# Patient Record
Sex: FEMALE | Race: WHITE | Employment: OTHER | URBAN - METROPOLITAN AREA
[De-identification: names, ages, dates, MRNs, and addresses within clinical notes are randomized per-mention and may not be internally consistent; named-entity substitution may affect disease eponyms.]

---

## 2017-01-26 ENCOUNTER — OFFICE VISIT (OUTPATIENT)
Dept: OBGYN CLINIC | Age: 22
End: 2017-01-26

## 2017-01-26 VITALS
WEIGHT: 233 LBS | HEIGHT: 63 IN | DIASTOLIC BLOOD PRESSURE: 74 MMHG | BODY MASS INDEX: 41.29 KG/M2 | SYSTOLIC BLOOD PRESSURE: 122 MMHG

## 2017-01-26 DIAGNOSIS — Z01.419 ENCOUNTER FOR GYNECOLOGICAL EXAMINATION WITHOUT ABNORMAL FINDING: ICD-10-CM

## 2017-01-26 DIAGNOSIS — Z01.419 WELL WOMAN EXAM WITH ROUTINE GYNECOLOGICAL EXAM: Primary | ICD-10-CM

## 2017-01-26 RX ORDER — NORETHINDRONE ACETATE AND ETHINYL ESTRADIOL 1.5-30(21)
1 KIT ORAL DAILY
Qty: 3 PACKAGE | Refills: 4 | Status: SHIPPED | OUTPATIENT
Start: 2017-01-26 | End: 2017-10-17

## 2017-01-26 NOTE — PATIENT INSTRUCTIONS

## 2017-01-26 NOTE — PROGRESS NOTES
Annual exam ages 21-44    Madonna Hope is a ,  19 y.o. female [facility] Patient's last menstrual period was 01/10/2017 (approximate). .    She presents for her annual checkup. She is having cramping alot lately while not on menses. She reports that the cramping started when she stopped ocps. With regard to the Gardasil vaccine, she has received all 3 injections. Menstrual status:    Her periods are spotting in flow. She is using essentially none pads or tampons per day, usually regular and last 26-30 days. She denies dysmenorrhea. She reports no premenstrual symptoms. Contraception:    The current method of family planning is none. Sexual history:     She  reports that she currently engages in sexual activity and has had male partners. She reports using the following method of birth control/protection: Pill. .    Medical conditions:    Since her last annual GYN exam about one year ago, she has not the following changes in her health history: none. Pap and Mammogram History:    Her most recent Pap smear was abnormal, obtained 1 year(s) ago. The patient has never had a mammogram.    Breast Cancer History/Substance Abuse: negative    Past Medical History   Diagnosis Date    Abnormal Pap smear of cervix      14 ASCUS HPV POS  7/1/15 ASCUS HPV POS    Depression     Pap smear for cervical cancer screening 2015 ASCUS HPV POS  7/1/15 ASCUS HPV POS     Past Surgical History   Procedure Laterality Date    Hx other surgical       colonoscopy    Hx dilation and curettage         Current Outpatient Prescriptions   Medication Sig Dispense Refill    norethindrone-ethinyl estradiol-iron (JUNEL FE ,) 1.5 mg-30 mcg (21)/75 mg (7) tablet Take 1 Tab by mouth daily. 1 Package 11    OTHER Birth Control medication. Unable to remember name       Allergies: Review of patient's allergies indicates no known allergies.      Tobacco History:  reports that she has never smoked. She has never used smokeless tobacco.  Alcohol Abuse:  reports that she drinks alcohol. Drug Abuse:  reports that she does not use illicit drugs.     Family Medical/Cancer History:   Family History   Problem Relation Age of Onset    No Known Problems Mother         Review of Systems - History obtained from the patient  Constitutional: negative for weight loss, fever, night sweats  HEENT: negative for hearing loss, earache, congestion, snoring, sorethroat  CV: negative for chest pain, palpitations, edema  Resp: negative for cough, shortness of breath, wheezing  GI: negative for change in bowel habits, abdominal pain, black or bloody stools  : negative for frequency, dysuria, hematuria, vaginal discharge  MSK: negative for back pain, joint pain, muscle pain  Breast: negative for breast lumps, nipple discharge, galactorrhea  Skin :negative for itching, rash, hives  Neuro: negative for dizziness, headache, confusion, weakness  Psych: negative for anxiety, depression, change in mood  Heme/lymph: negative for bleeding, bruising, pallor    Physical Exam    Visit Vitals    /74 (BP 1 Location: Right arm, BP Patient Position: Sitting)    Ht 5' 3\" (1.6 m)    Wt 233 lb (105.7 kg)    LMP 01/10/2017 (Approximate)    BMI 41.27 kg/m2       Constitutional  · Appearance: well-nourished, well developed, alert, in no acute distress    HENT  · Head and Face: appears normal    Neck  · Inspection/Palpation: normal appearance, no masses or tenderness  · Lymph Nodes: no lymphadenopathy present  · Thyroid: gland size normal, nontender, no nodules or masses present on palpation    Chest  · Respiratory Effort: breathing unlabored    Breasts  · Inspection of Breasts: breasts symmetrical, no skin changes, no discharge present, nipple appearance normal, no skin retraction present  · Palpation of Breasts and Axillae: no masses present on palpation, no breast tenderness  · Axillary Lymph Nodes: no lymphadenopathy present    Gastrointestinal  · Abdominal Examination: abdomen non-tender to palpation, normal bowel sounds, no masses present  · Liver and spleen: no hepatomegaly present, spleen not palpable  · Hernias: no hernias identified    Genitourinary  · External Genitalia: normal appearance for age, no discharge present, no tenderness present, no inflammatory lesions present, no masses present, no atrophy present  · Vagina: normal vaginal vault without central or paravaginal defects, no discharge present, no inflammatory lesions present, no masses present  · Bladder: non-tender to palpation  · Urethra: appears normal  · Cervix: normal   · Uterus: normal size, shape and consistency  · Adnexa: no adnexal tenderness present, no adnexal masses present  · Perineum: perineum within normal limits, no evidence of trauma, no rashes or skin lesions present  · Anus: anus within normal limits, no hemorrhoids present  · Inguinal Lymph Nodes: no lymphadenopathy present    Skin  · General Inspection: no rash, no lesions identified    Neurologic/Psychiatric  · Mental Status:  · Orientation: grossly oriented to person, place and time  · Mood and Affect: mood normal, affect appropriate    . Assessment:  Routine gynecologic examination  Her current medical status is satisfactory with no evidence of significant gynecologic issues. Cramping- will restart ocps, if continues then will rto for ultrasound.     Plan:  Counseled re: diet, exercise, healthy lifestyle  Return for yearly wellness visits

## 2017-01-27 LAB
HBV SURFACE AG SERPL QL IA: NEGATIVE
HCV AB S/CO SERPL IA: <0.1 S/CO RATIO (ref 0–0.9)
HIV 1+2 AB+HIV1 P24 AG SERPL QL IA: NON REACTIVE
RPR SER QL: NON REACTIVE

## 2017-01-31 LAB
C TRACH RRNA CVX QL NAA+PROBE: NEGATIVE
CYTOLOGIST CVX/VAG CYTO: NORMAL
CYTOLOGY CVX/VAG DOC THIN PREP: NORMAL
CYTOLOGY HISTORY:: NORMAL
DX ICD CODE: NORMAL
LABCORP, 190119: NORMAL
Lab: NORMAL
Lab: NORMAL
N GONORRHOEA RRNA CVX QL NAA+PROBE: NEGATIVE
OTHER STN SPEC: NORMAL
PATH REPORT.FINAL DX SPEC: NORMAL
STAT OF ADQ CVX/VAG CYTO-IMP: NORMAL
T VAGINALIS RRNA SPEC QL NAA+PROBE: NEGATIVE

## 2017-10-17 ENCOUNTER — OFFICE VISIT (OUTPATIENT)
Dept: OBGYN CLINIC | Age: 22
End: 2017-10-17

## 2017-10-17 VITALS
DIASTOLIC BLOOD PRESSURE: 78 MMHG | BODY MASS INDEX: 40.04 KG/M2 | HEIGHT: 63 IN | WEIGHT: 226 LBS | SYSTOLIC BLOOD PRESSURE: 124 MMHG

## 2017-10-17 DIAGNOSIS — Z34.01 ENCOUNTER FOR SUPERVISION OF NORMAL FIRST PREGNANCY IN FIRST TRIMESTER: ICD-10-CM

## 2017-10-17 DIAGNOSIS — Z32.01 PREGNANCY EXAMINATION OR TEST, POSITIVE RESULT: Primary | ICD-10-CM

## 2017-10-17 PROBLEM — Z34.00 SUPERVISION OF NORMAL FIRST PREGNANCY: Status: ACTIVE | Noted: 2017-10-17

## 2017-10-17 LAB
ANTIBODY SCREEN, EXTERNAL: NEGATIVE
CHLAMYDIA, EXTERNAL: NEGATIVE
CYSTIC FIBROSIS, EXTERNAL: NORMAL
HBSAG, EXTERNAL: NEGATIVE
HCT, EXTERNAL: 38.5
HGB, EXTERNAL: 12.5
HIV, EXTERNAL: NEGATIVE
N. GONORRHEA, EXTERNAL: NEGATIVE
PLATELET CNT,   EXTERNAL: 302
RUBELLA, EXTERNAL: NORMAL
T. PALLIDUM, EXTERNAL: NEGATIVE
TYPE, ABO & RH, EXTERNAL: NORMAL
URINALYSIS, EXTERNAL: NEGATIVE

## 2017-10-17 NOTE — PROGRESS NOTES
Current pregnancy history: Doris Blackman is a ,  23 y.o. female Formerly named [facility] Patient's last menstrual period was 2017 (exact date). .  She presents for the evaluation of amenorrhea and a positive pregnancy test.    LMP history:  The date of her LMP is certain. Her last menstrual period was normal and lasted for 4 to 5 days. A urine pregnancy test was positive a few weeks ago. She was not on the pill at conception. Based on her LMP, her EDC is 18 and her EGA is 7 weeks,6 days. Her menstrual cycles are regular and occur approximately every 28 days  and range from 3 to 5 days. The last menses lasted the usual number of days. Ultrasound data:  She had an  ultrasound done by the ultrasound tech today which revealed a viable brunner pregnancy with a gestational age of 9 weeks and 0 days giving an [facility] of 18. Pregnancy symptoms:    Since her LMP she has experienced  urinary frequency, breast tenderness, and nausea. She has not been vomiting over the last few weeks. Associated signs and symptoms which she denies: dysuria, discharge, vaginal bleeding. Relevant past pregnancy history:   She has the following pregnancy history: Her last pregnancy was a missed AB. Relevant past medical history:(relevant to this pregnancy): noncontributory. Substance history: negative for alcohol, tobacco and street drugs. Positive for nothing. The patient was instructed to not change the cat litter. She admits close contact with children on a regular basis. She has had chicken pox or the vaccine in the past.   Patient denies issues with domestic violence. Genetic Screening/Teratology Counseling: (Includes patient, baby's father, or anyone in either family with:)  3.  Patient's age >/= 28 at [facility]?-- no  .   2.   Thalassemia ([place], [place], [address], or  background): MCV<80?--no.     3.  Neural tube defect (meningomyelocele, spina bifida, anencephaly)?--no.   4.  Congenital heart defect?--no.  5.  Down syndrome?--no.   6.  Cooper-Sachs (Religious, [place])?--no.   7.  Canavan's Disease?--no.   8.  Familial Dysautonomia?--no.   9.  Sickle cell disease or trait ()? --no   The patient has not been tested for sickle trait  10. Hemophilia or other blood disorders?--no. 11.  Muscular dystrophy?--no. 12.  Cystic fibrosis?--no. 13.  Hendricks's Chorea?--no. 14.  Mental retardation/autism (if yes was person tested for Fragile X)?--no. 15.  Other inherited genetic or chromosomal disorder?--no. 12.  Maternal metabolic disorder (DM, PKU, etc)?--no. 17.  Patient or FOB with a child with a birth defect not listed above?--no.  17a. Patient or FOB with a birth defect themselves?--no. 18.  Recurrent pregnancy loss, or stillbirth?--no. 19.  Any medications since LMP other than prenatal vitamins (include vitamins, supplements, OTC meds, drugs, alcohol)?--no. 20.  Any other genetic/environmental exposure to discuss?--no. Infection History:  1. Lives with someone with TB or TB exposed?--no.   2.  Patient or partner has history of genital herpes?--yes, pt with h/o hsv  3. Rash or viral illness since LMP?--no.    4.  History of STD (GC, CT, HPV, syphilis, HIV)? --no   5. Other: OTHER? Past Medical History:   Diagnosis Date    Abnormal Pap smear of cervix     5/28/14 ASCUS HPV POS  7/1/15 ASCUS HPV POS    Depression     Pap smear for cervical cancer screening 01/26/2017 5/28/14 ASCUS HPV POS  7/1/15 ASCUS HPV POS, 1/26/17 neg     Past Surgical History:   Procedure Laterality Date    HX DILATION AND CURETTAGE      HX OTHER SURGICAL      colonoscopy     Social History     Occupational History    Not on file.      Social History Main Topics    Smoking status: Never Smoker    Smokeless tobacco: Never Used    Alcohol use Yes      Comment: occasionally    Drug use: No    Sexual activity: Yes     Partners: Male     Birth control/ protection: Pill     Family History   Problem Relation Age of Onset    No Known Problems Mother      OB History    Para Term  AB Living   2        SAB TAB Ectopic Molar Multiple Live Births              # Outcome Date GA Lbr Taqueria/2nd Weight Sex Delivery Anes PTL Lv   2 Current            1                Birth Comments: System Generated. Please review and update pregnancy details. No Known Allergies  Prior to Admission medications    Medication Sig Start Date End Date Taking? Authorizing Provider   OTHER Birth Control medication.  Unable to remember name    Phys Other, MD        Review of Systems: History obtained from the patient  Constitutional: negative for weight loss, fever, night sweats  HEENT: negative for hearing loss, earache, congestion, snoring, sore throat  CV: negative for chest pain, palpitations, edema  Resp: negative for cough, shortness of breath, wheezing  Breast: negative for breast lumps, nipple discharge, galactorrhea  GI: negative for change in bowel habits, abdominal pain, black or bloody stools  : negative for frequency, dysuria, hematuria, vaginal discharge  MSK: negative for back pain, joint pain, muscle pain  Skin: negative for itching, rash, hives  Neuro: negative for dizziness, headache, confusion, weakness  Psych: negative for anxiety, depression, change in mood  Heme/lymph: negative for bleeding, bruising, pallor    Objective:  Visit Vitals    /78    Ht 5' 3\" (1.6 m)    Wt 226 lb (102.5 kg)    LMP 2017 (Exact Date)    BMI 40.03 kg/m2       Physical Exam:     Constitutional  · Appearance: well-nourished, well developed, alert, in no acute distress    HENT  · Head  · Face: appears normal  · Eyes: appear normal  · Ears: normal  · Mouth: normal  · Lips: no lesions    Neck  · Inspection/Palpation: normal appearance, no masses or tenderness  · Lymph Nodes: no lymphadenopathy present  · Thyroid: gland size normal, nontender, no nodules or masses present on palpation    Chest  · Respiratory Effort: breathing unlabored    Skin  · General Inspection: no rash, no lesions identified    Neurologic/Psychiatric  · Mental Status:  · Orientation: grossly oriented to person, place and time  · Mood and Affect: mood normal, affect appropriate    Assessment:   Intrauterine pregnancy with the following problems identified: h/o hsv, will start valtex at 36 weeks. Plan:     Offered CF testing, CVS, Nuchal Translucency, MSAFP, amnio, and discussed NIPT  Course of pregnancy discussed including visit schedule, routine U/S, glucola testing, etc.  Avoid alcoholic beverages and illicit/recreational drugs use  Take prenatal vitamins or folic acid daily. Hospital and practice style discussed with coverage system. Discussed nutrition, toxoplasmosis precautions, sexual activity, exercise, need for influenza vaccine, environmental and work hazards, travel advice, screen for domestic violence, need for seat belts. Discussed seafood, unpasteurized dairy products, deli meat, artificial sweeteners, and caffeine. Information on prenatal classes/breastfeeding given. Information on circumcision given  Patient encouraged not to smoke. Discussed current prescription drug use. Given medication list.  Discussed the use of over the counter medications and chemicals. Route of delivery discussed, including risks, benefits, and alternatives of  versus repeat LTCS. Pt understands risk of hemorrhage during pregnancy and post delivery and would accept blood products if necessary in life-threatening emergencies    Handouts given to pt.

## 2017-10-17 NOTE — MR AVS SNAPSHOT
Visit Information Date & Time Provider Department Dept. Phone Encounter #  
 10/17/2017  1:30 PM Jovita Arguelles MD [facility] [phone] [id number] Upcoming Health Maintenance Date Due INFLUENZA AGE 9 TO ADULT 8/1/2017 PAP AKA CERVICAL CYTOLOGY 1/26/2020 Allergies as of 10/17/2017  Review Complete On: 10/17/2017 By: Jovita Arguelles MD  
 No Known Allergies Current Immunizations  Never Reviewed Name Date HPV (9-valent) 2/19/2016, 10/19/2015, 8/18/2015 10:07 AM  
  
 Not reviewed this visit You Were Diagnosed With   
  
 Codes Comments Pregnancy examination or test, positive result    -  Primary ICD-10-CM: Z32.01 
ICD-9-CM: V72.42 Encounter for supervision of normal first pregnancy in first trimester     ICD-10-CM: Z34.01 
ICD-9-CM: V22.0 Vitals BP Height(growth percentile) Weight(growth percentile) LMP BMI OB Status 124/78 5' 3\" (1.6 m) 226 lb (102.5 kg) 08/23/2017 (Exact Date) 40.03 kg/m2 Pregnant Smoking Status Never Smoker BMI and BSA Data Body Mass Index Body Surface Area 40.03 kg/m 2 2.13 m 2 Preferred Pharmacy Pharmacy Name Phone [facility] [address], [address] [place] [address] [address] [phone] Your Updated Medication List  
  
   
This list is accurate as of: 10/17/17  1:47 PM.  Always use your most recent med list.  
  
  
  
  
 OTHER Birth Control medication. Unable to remember name We Performed the Following ANTIBODY SCREEN A0035874 CPT(R)] CBC W/O DIFF [87895 CPT(R)] CT/NG/T.VAGINALIS AMPLIFICATION [id number] CPT(R)] CULTURE, URINE V7942696 CPT(R)] CYSTIC FIBROSIS MUTATION 97 [[id number] Custom] HEMOGLOBIN FRACTIONATION [[id number] Custom] HEP B SURFACE AG X2365066 CPT(R)] HIV 1/2 AG/AB, 4TH GENERATION,W RFLX CONFIRM [[id number] Custom] PARVOVIRUS B19 AB, IGG [70823 CPT(R)] PARVOVIRUS B19 AB, IGM [95942 CPT(R)] PLATELET COUNT [47359 CPT(R)] RUBELLA AB, IGG [id number] CPT(R)] T PALLIDUM SCREEN W/REFLEX [[id number] Custom] TYPE, ABO & RH [42223 CPT(R)] Introducing [facility]! Dear Nelson Smart: 
Thank you for requesting a [employer] account. Our records indicate that you already have an active [employer] account. You can access your account anytime at [email or web address] Did you know that you can access your hospital and ER discharge instructions at any time in [employer]? You can also review all of your test results from your hospital stay or ER visit. Additional Information If you have questions, please visit the Frequently Asked Questions section of the [employer] website at [email or web address] Remember, [employer] is NOT to be used for urgent needs. For medical emergencies, dial 911. Now available from your iPhone and Android! Please provide this summary of care documentation to your next provider. Your primary care clinician is listed as Kate Rowe. If you have any questions after today's visit, please call [phone].

## 2017-10-18 LAB — BACTERIA UR CULT: NO GROWTH

## 2017-10-19 LAB
C TRACH RRNA SPEC QL NAA+PROBE: NEGATIVE
N GONORRHOEA RRNA SPEC QL NAA+PROBE: NEGATIVE
T VAGINALIS RRNA SPEC QL NAA+PROBE: NEGATIVE

## 2017-10-24 LAB
ABO GROUP BLD: NORMAL
B19V IGG SER IA-ACNC: 5.7 INDEX (ref 0–0.8)
B19V IGM SER IA-ACNC: 0.1 INDEX (ref 0–0.8)
BLD GP AB SCN SERPL QL: NEGATIVE
CFTR MUT ANL BLD/T: NORMAL
ERYTHROCYTE [DISTWIDTH] IN BLOOD BY AUTOMATED COUNT: 13.9 % (ref 12.3–15.4)
GENE DIS ANL CARRIER INTERP-IMP: NORMAL
HBV SURFACE AG SERPL QL IA: NEGATIVE
HCT VFR BLD AUTO: 38.5 % (ref 34–46.6)
HGB A MFR BLD: 97.6 % (ref 94–98)
HGB A2 MFR BLD COLUMN CHROM: 2.4 % (ref 0.7–3.1)
HGB BLD-MCNC: 12.5 G/DL (ref 11.1–15.9)
HGB C MFR BLD: 0 %
HGB F MFR BLD: 0 % (ref 0–2)
HGB FRACT BLD-IMP: NORMAL
HGB S BLD QL SOLY: NEGATIVE
HGB S MFR BLD: 0 %
HIV 1+2 AB+HIV1 P24 AG SERPL QL IA: NON REACTIVE
MCH RBC QN AUTO: 29.6 PG (ref 26.6–33)
MCHC RBC AUTO-ENTMCNC: 32.5 G/DL (ref 31.5–35.7)
MCV RBC AUTO: 91 FL (ref 79–97)
PLATELET # BLD AUTO: 302 X10E3/UL (ref 150–379)
RBC # BLD AUTO: 4.23 X10E6/UL (ref 3.77–5.28)
RH BLD: POSITIVE
RUBV IGG SERPL IA-ACNC: 2.21 INDEX
T PALLIDUM AB SER QL IA: NEGATIVE
WBC # BLD AUTO: 8.6 X10E3/UL (ref 3.4–10.8)

## 2017-11-14 ENCOUNTER — ROUTINE PRENATAL (OUTPATIENT)
Dept: OBGYN CLINIC | Age: 22
End: 2017-11-14

## 2017-11-14 VITALS — WEIGHT: 219 LBS | DIASTOLIC BLOOD PRESSURE: 84 MMHG | SYSTOLIC BLOOD PRESSURE: 118 MMHG | BODY MASS INDEX: 38.79 KG/M2

## 2017-11-14 DIAGNOSIS — Z34.01 ENCOUNTER FOR SUPERVISION OF NORMAL FIRST PREGNANCY IN FIRST TRIMESTER: ICD-10-CM

## 2017-11-14 DIAGNOSIS — Z34.81 PRENATAL CARE, SUBSEQUENT PREGNANCY IN FIRST TRIMESTER: Primary | ICD-10-CM

## 2017-11-14 NOTE — MR AVS SNAPSHOT
Visit Information Date & Time Provider Department Dept. Phone Encounter #  
 11/14/2017  7:40 AM Yao Bertrand MD [facility] [phone] [id number] Upcoming Health Maintenance Date Due Influenza Age 5 to Adult 8/1/2017 PAP AKA CERVICAL CYTOLOGY 1/26/2020 Allergies as of 11/14/2017  Review Complete On: 11/14/2017 By: David Brunson No Known Allergies Current Immunizations  Never Reviewed Name Date HPV (9-valent) 2/19/2016, 10/19/2015, 8/18/2015 10:07 AM  
  
 Not reviewed this visit You Were Diagnosed With   
  
 Codes Comments Prenatal care, subsequent pregnancy in first trimester    -  Primary ICD-10-CM: Z34.81 ICD-9-CM: V22.1 Vitals BP Weight(growth percentile) LMP BMI OB Status Smoking Status 118/84 219 lb (99.3 kg) 08/23/2017 (Exact Date) 38.79 kg/m2 Pregnant Never Smoker BMI and BSA Data Body Mass Index Body Surface Area 38.79 kg/m 2 2.1 m 2 Preferred Pharmacy Pharmacy Name Phone [facility] [address] [place] [address] [address] [facility] [address] [phone] Your Updated Medication List  
  
   
This list is accurate as of: 11/14/17  8:02 AM.  Always use your most recent med list.  
  
  
  
  
 doxylamine-pyridoxine (vit B6) 10-10 mg Tbec DR tablet Commonly known as:  Sarah Degroot Take 2 Tabs by mouth nightly. ondansetron hcl 8 mg tablet Commonly known as:  Humera Alarcon Take 1 Tab by mouth every eight (8) hours as needed for Nausea. OTHER Birth Control medication. Unable to remember name We Performed the Following MATERNAL SCREEN, 1ST TRIMESTER [[id number] Custom] Comments:  
 219 pounds 1fetus 11weeks 6days NOT insulin dep PRAVIN 5/30/18  To-Do List   
 11/20/2017 10:15 AM  
  Appointment with ULTRASOUND 3 [facility] at [facility] ([phone]) [facility]! Dear Tiffanie Blevins: Thank you for requesting a [employer] account. Our records indicate that you already have an active [employer] account. You can access your account anytime at [email or web address] Did you know that you can access your hospital and ER discharge instructions at any time in [employer]? You can also review all of your test results from your hospital stay or ER visit. Additional Information If you have questions, please visit the Frequently Asked Questions section of the [employer] website at [email or web address] Remember, [employer] is NOT to be used for urgent needs. For medical emergencies, dial 911. Now available from your iPhone and Android! Please provide this summary of care documentation to your next provider. Your primary care clinician is listed as Kate Rowe. If you have any questions after today's visit, please call [phone].

## 2017-11-20 ENCOUNTER — HOSPITAL ENCOUNTER (OUTPATIENT)
Dept: PERINATAL CARE | Age: 22
Discharge: HOME | End: 2017-11-20
Attending: OBSTETRICS & GYNECOLOGY
Payer: COMMERCIAL

## 2017-11-20 LAB
# FETUSES US: 1
1ST TRIMESTER SCN+NT PATIENT-IMP: NORMAL
AGE AT DELIVERY: 24.9 YEARS
COMMENTS, 017532: NORMAL
FET CRL US.MEAS: 50.8 MM
FET NUCHAL FOLD MOM THICKNESS US.MEAS: 1.31
FET NUCHAL FOLD THICKNESS US.MEAS: 1.8 MM
FET TS 18 RISK FROM MAT AGE: NORMAL
FET TS 21 RISK FROM MAT AGE: NORMAL
GA: 11 WEEKS
HCG ADJ MOM SERPL: 1.03
HCG SERPL-ACNC: 98.6 IU/ML
INHIBIN A ADJ MOM SERPL: 0.97
INHIBIN A SERPL-MCNC: 251.9 PG/ML
NOTE, 018271: NORMAL
PAPP-A MOM, 017516: 0.54
PAPP-A SERPL-MCNC: 173.7 NG/ML
RESULTS, 017501: NORMAL
SONOGRAPHER: NORMAL
TS 18 RISK FETUS: NORMAL
TS 21 RISK FETUS: NORMAL
US DATE: NORMAL

## 2017-11-20 PROCEDURE — 76813 OB US NUCHAL MEAS 1 GEST: CPT | Performed by: OBSTETRICS & GYNECOLOGY

## 2017-12-12 ENCOUNTER — ROUTINE PRENATAL (OUTPATIENT)
Dept: OBGYN CLINIC | Age: 22
End: 2017-12-12

## 2017-12-12 VITALS — BODY MASS INDEX: 38.09 KG/M2 | WEIGHT: 215 LBS | DIASTOLIC BLOOD PRESSURE: 78 MMHG | SYSTOLIC BLOOD PRESSURE: 116 MMHG

## 2017-12-12 DIAGNOSIS — Z34.02 ENCOUNTER FOR SUPERVISION OF NORMAL FIRST PREGNANCY IN SECOND TRIMESTER: ICD-10-CM

## 2017-12-12 DIAGNOSIS — Z34.82 PRENATAL CARE, SUBSEQUENT PREGNANCY IN SECOND TRIMESTER: Primary | ICD-10-CM

## 2017-12-12 LAB — AFP, MATERNAL, EXTERNAL: NORMAL

## 2017-12-12 NOTE — MR AVS SNAPSHOT
Visit Information Date & Time Provider Department Dept. Phone Encounter #  
 12/12/2017  8:30 AM Hernan Calvin MD [facility] [phone] [id number] Upcoming Health Maintenance Date Due Influenza Age 5 to Adult 8/1/2017 PAP AKA CERVICAL CYTOLOGY 1/26/2020 Allergies as of 12/12/2017  Review Complete On: 12/12/2017 By: Miroslava Emerson RN No Known Allergies Current Immunizations  Never Reviewed Name Date HPV (9-valent) 2/19/2016, 10/19/2015, 8/18/2015 10:07 AM  
  
 Not reviewed this visit You Were Diagnosed With   
  
 Codes Comments Prenatal care, subsequent pregnancy in second trimester    -  Primary ICD-10-CM: Z34.82 
ICD-9-CM: V22.1 Vitals BP Weight(growth percentile) LMP BMI OB Status Smoking Status 116/78 215 lb (97.5 kg) 08/23/2017 (Exact Date) 38.09 kg/m2 Pregnant Never Smoker BMI and BSA Data Body Mass Index Body Surface Area 38.09 kg/m 2 2.08 m 2 Preferred Pharmacy Pharmacy Name Phone [facility] [address], [address] [facility] [address] [phone] Your Updated Medication List  
  
   
This list is accurate as of: 12/12/17  8:45 AM.  Always use your most recent med list.  
  
  
  
  
 doxylamine-pyridoxine (vit B6) 10-10 mg Tbec DR tablet Commonly known as:  [facility] Take 2 Tabs by mouth nightly. ondansetron hcl 8 mg tablet Commonly known as:  [facility] Take 1 Tab by mouth every eight (8) hours as needed for Nausea. OTHER Birth Control medication. Unable to remember name We Performed the Following   
 AFP, MATERNAL SCREEN [13895 CPT(R)] Patient Instructions Weeks 14 to 18 of Your Pregnancy: Care Instructions Your Care Instructions During this time, you may start to \"show,\" so that you look pregnant to people around you.  You may also notice some changes in your skin, such as itchy spots on your palms or acne on your face. Your baby is now able to pass urine, and your baby's first stool (meconium) is starting to collect in his or her intestines. Hair is also beginning to grow on your baby's head. At your next visit, between weeks 18 and 20, your doctor may do an ultrasound test. The test allows your doctor to check for certain problems. Your doctor can also tell the sex of your baby. This is a good time to think about whether you want to know whether your baby is a boy or a girl. Talk to your doctor about getting a flu shot to help keep you healthy during your pregnancy. As your pregnancy moves along, it is common to worry or feel anxious. Your body is changing a lot. And you are thinking about giving birth, the health of your baby, and becoming a parent. You can learn to cope with any anxiety and stress you feel. Follow-up care is a key part of your treatment and safety. Be sure to make and go to all appointments, and call your doctor if you are having problems. It's also a good idea to know your test results and keep a list of the medicines you take. How can you care for yourself at home? ?Reduce stress ? · Ask for help with cooking and housekeeping. ? · Figure out who or what causes your stress. Avoid these people or situations as much as possible. ? · Relax every day. Taking 10- to 15-minute breaks can make a big difference. Take a walk, listen to music, or take a warm bath. ? · Learn relaxation techniques at prenatal or yoga class. Or buy a relaxation tape. ? · List your fears about having a baby and becoming a parent. Share the list with someone you trust. Decide which worries are really small, and try to let them go. Exercise ? · If you did not exercise much before pregnancy, start slowly. Walking is best. Velez Evangelina yourself, and do a little more every day.   
? · Brisk walking, easy jogging, low-impact aerobics, water aerobics, and yoga are good choices. Some sports, such as scuba diving, horseback riding, downhill skiing, gymnastics, and water skiing, are not a good idea. ? · Try to do at least 2½ hours a week of moderate exercise, such as a fast walk. One way to do this is to be active 30 minutes a day, at least 5 days a week. It's fine to be active in blocks of 10 minutes or more throughout your day and week. ? · Wear loose clothing. And wear shoes and a bra that provide good support. ? · Warm up and cool down to start and finish your exercise. ? · If you want to use weights, be sure to use light weights. They reduce stress on your joints. ?Stay at the best weight for you ? · Experts recommend that you gain about 1 pound a month during the first 3 months of your pregnancy. ? · Experts recommend that you gain about 1 pound a week during your last 6 months of pregnancy, for a total weight gain of 25 to 35 pounds. ? · If you are underweight, you will need to gain more weight (about 28 to 40 pounds). ? · If you are overweight, you may not need to gain as much weight (about 15 to 25 pounds). ? · If you are gaining weight too fast, use common sense. Exercise every day, and limit sweets, fast foods, and fats. Choose lean meats, fruits, and vegetables. ? · If you are having twins or more, your doctor may refer you to a dietitian. Where can you learn more? Go to http://nghia-keshawn.info/. Enter S495 in the search box to learn more about \"Weeks 14 to 18 of Your Pregnancy: Care Instructions. \" Current as of: March 16, 2017 Content Version: 11.4 © 1843-6999 Teachable. Care instructions adapted under license by [employer] (which disclaims liability or warranty for this information).  If you have questions about a medical condition or this instruction, always ask your healthcare professional. [address] any warranty or liability for your use of this information. Introducing \[facility]\"" & HEALTH SERVICES! Dear [facility]: 
Thank you for requesting a Kingmaker account. Our records indicate that you already have an active Kingmaker account. You can access your account anytime at https://Aria Networks. Acucar Guarani/Aria Networks Did you know that you can access your hospital and ER discharge instructions at any time in Kingmaker? You can also review all of your test results from your hospital stay or ER visit. Additional Information If you have questions, please visit the Frequently Asked Questions section of the Kingmaker website at [email or web address] Remember, Kingmaker is NOT to be used for urgent needs. For medical emergencies, dial 911. Now available from your iPhone and Android! Please provide this summary of care documentation to your next provider. Your primary care clinician is listed as Kate Rowe. If you have any questions after today's visit, please call [phone].

## 2017-12-12 NOTE — PATIENT INSTRUCTIONS
Weeks 14 to 18 of Your Pregnancy: Care Instructions  Your Care Instructions    During this time, you may start to \"show,\" so that you look pregnant to people around you. You may also notice some changes in your skin, such as itchy spots on your palms or acne on your face. Your baby is now able to pass urine, and your baby's first stool (meconium) is starting to collect in his or her intestines. Hair is also beginning to grow on your baby's head. At your next visit, between weeks 18 and 20, your doctor may do an ultrasound test. The test allows your doctor to check for certain problems. Your doctor can also tell the sex of your baby. This is a good time to think about whether you want to know whether your baby is a boy or a girl. Talk to your doctor about getting a flu shot to help keep you healthy during your pregnancy. As your pregnancy moves along, it is common to worry or feel anxious. Your body is changing a lot. And you are thinking about giving birth, the health of your baby, and becoming a parent. You can learn to cope with any anxiety and stress you feel. Follow-up care is a key part of your treatment and safety. Be sure to make and go to all appointments, and call your doctor if you are having problems. It's also a good idea to know your test results and keep a list of the medicines you take. How can you care for yourself at home? ?Reduce stress  ? · Ask for help with cooking and housekeeping. ? · Figure out who or what causes your stress. Avoid these people or situations as much as possible. ? · Relax every day. Taking 10- to 15-minute breaks can make a big difference. Take a walk, listen to music, or take a warm bath. ? · Learn relaxation techniques at prenatal or yoga class. Or buy a relaxation tape. ? · List your fears about having a baby and becoming a parent. Share the list with someone you trust. Decide which worries are really small, and try to let them go. Exercise  ?  · If you did not exercise much before pregnancy, start slowly. Walking is best. Donnamarie Silvius yourself, and do a little more every day. ? · Brisk walking, easy jogging, low-impact aerobics, water aerobics, and yoga are good choices. Some sports, such as scuba diving, horseback riding, downhill skiing, gymnastics, and water skiing, are not a good idea. ? · Try to do at least 2½ hours a week of moderate exercise, such as a fast walk. One way to do this is to be active 30 minutes a day, at least 5 days a week. It's fine to be active in blocks of 10 minutes or more throughout your day and week. ? · Wear loose clothing. And wear shoes and a bra that provide good support. ? · Warm up and cool down to start and finish your exercise. ? · If you want to use weights, be sure to use light weights. They reduce stress on your joints. ?Stay at the best weight for you  ? · Experts recommend that you gain about 1 pound a month during the first 3 months of your pregnancy. ? · Experts recommend that you gain about 1 pound a week during your last 6 months of pregnancy, for a total weight gain of 25 to 35 pounds. ? · If you are underweight, you will need to gain more weight (about 28 to 40 pounds). ? · If you are overweight, you may not need to gain as much weight (about 15 to 25 pounds). ? · If you are gaining weight too fast, use common sense. Exercise every day, and limit sweets, fast foods, and fats. Choose lean meats, fruits, and vegetables. ? · If you are having twins or more, your doctor may refer you to a dietitian. Where can you learn more? Go to http://nghia-keshawn.info/. Enter G992 in the search box to learn more about \"Weeks 14 to 18 of Your Pregnancy: Care Instructions. \"  Current as of: March 16, 2017  Content Version: 11.4  © 3237-6093 Food52. Care instructions adapted under license by [employer] (which disclaims liability or warranty for this information).  If you have questions about a medical condition or this instruction, always ask your healthcare professional. [place] [address] any warranty or liability for your use of this information.

## 2017-12-12 NOTE — PROGRESS NOTES
Pt doing well, see prenatal flowsheet.   msafp today  20 week ultrasound at [facility]

## 2017-12-14 LAB
AFP ADJ MOM SERPL: 0.64
AFP INTERP SERPL-IMP: NORMAL
AFP INTERP SERPL-IMP: NORMAL
AFP SERPL-MCNC: 14.2 NG/ML
AGE AT DELIVERY: 24.9 YEARS
COMMENT, 01827: NORMAL
GA METHOD: NORMAL
GA: 15 WEEKS
IDDM PATIENT QL: NO
MULTIPLE PREGNANCY: NO
NEURAL TUBE DEFECT RISK FETUS: NORMAL %
RESULTS, 017004: NORMAL

## 2018-01-09 ENCOUNTER — ROUTINE PRENATAL (OUTPATIENT)
Dept: OBGYN CLINIC | Age: 23
End: 2018-01-09

## 2018-01-09 VITALS — DIASTOLIC BLOOD PRESSURE: 76 MMHG | SYSTOLIC BLOOD PRESSURE: 124 MMHG | BODY MASS INDEX: 37.2 KG/M2 | WEIGHT: 210 LBS

## 2018-01-09 DIAGNOSIS — Z34.02 ENCOUNTER FOR SUPERVISION OF NORMAL FIRST PREGNANCY IN SECOND TRIMESTER: Primary | ICD-10-CM

## 2018-01-09 NOTE — PROGRESS NOTES
Pt doing well, see prenatal flowsheet.   20 week scan with [facility] next week

## 2018-01-09 NOTE — MR AVS SNAPSHOT
Visit Information Date & Time Provider Department Dept. Phone Encounter #  
 1/9/2018  7:50 AM Silviano Mcgowan MD [facility] [phone] [id number] Upcoming Health Maintenance Date Due Influenza Age 5 to Adult 8/1/2017 PAP AKA CERVICAL CYTOLOGY 1/26/2020 Allergies as of 1/9/2018  Review Complete On: 1/9/2018 By: Andres America No Known Allergies Current Immunizations  Never Reviewed Name Date HPV (9-valent) 2/19/2016, 10/19/2015, 8/18/2015 10:07 AM  
  
 Not reviewed this visit You Were Diagnosed With   
  
 Codes Comments Encounter for supervision of normal first pregnancy in second trimester    -  Primary ICD-10-CM: Z34.02 
ICD-9-CM: V22.0 Vitals BP Weight(growth percentile) LMP BMI OB Status Smoking Status 124/76 210 lb (95.3 kg) 08/23/2017 (Exact Date) 37.2 kg/m2 Pregnant Never Smoker BMI and BSA Data Body Mass Index Body Surface Area  
 37.2 kg/m 2 2.06 m 2 Preferred Pharmacy Pharmacy Name Phone [address], [facility] [address] [facility] [address] [phone] Your Updated Medication List  
  
   
This list is accurate as of: 1/9/18  8:17 AM.  Always use your most recent med list.  
  
  
  
  
 doxylamine-pyridoxine (vit B6) 10-10 mg Tbec DR tablet Commonly known as:  Bernis Cyphers Take 2 Tabs by mouth nightly. ondansetron hcl 8 mg tablet Commonly known as:  Patsy Fracisco Take 1 Tab by mouth every eight (8) hours as needed for Nausea. OTHER Birth Control medication. Unable to remember name Introducing [facility] & HEALTH SERVICES! Dear Jake Fabian: 
Thank you for requesting a TheCreator.ME account. Our records indicate that you already have an active TheCreator.ME account. You can access your account anytime at [email or web address] (formerly Tabula Digita). Aspiring Minds/DimensionU (formerly Tabula Digita) Did you know that you can access your hospital and ER discharge instructions at any time in ADTZ? You can also review all of your test results from your hospital stay or ER visit. Additional Information If you have questions, please visit the Frequently Asked Questions section of the ADTZ website at [email or web address] [email or web address] Angelst/. Remember, ADTZ is NOT to be used for urgent needs. For medical emergencies, dial 911. Now available from your iPhone and Android! Please provide this summary of care documentation to your next provider. Your primary care clinician is listed as Kate Rowe. If you have any questions after today's visit, please call [phone].

## 2018-01-16 ENCOUNTER — HOSPITAL ENCOUNTER (OUTPATIENT)
Dept: PERINATAL CARE | Age: 23
Discharge: HOME | End: 2018-01-16
Attending: OBSTETRICS & GYNECOLOGY
Payer: COMMERCIAL

## 2018-01-16 PROCEDURE — 76811 OB US DETAILED SNGL FETUS: CPT | Performed by: OBSTETRICS & GYNECOLOGY

## 2018-02-06 ENCOUNTER — ROUTINE PRENATAL (OUTPATIENT)
Dept: OBGYN CLINIC | Age: 23
End: 2018-02-06

## 2018-02-06 VITALS — SYSTOLIC BLOOD PRESSURE: 110 MMHG | WEIGHT: 215 LBS | BODY MASS INDEX: 38.09 KG/M2 | DIASTOLIC BLOOD PRESSURE: 72 MMHG

## 2018-02-06 DIAGNOSIS — Z34.02 ENCOUNTER FOR SUPERVISION OF NORMAL FIRST PREGNANCY IN SECOND TRIMESTER: Primary | ICD-10-CM

## 2018-02-06 NOTE — MR AVS SNAPSHOT
[address] [facility] [address] [facility] 
[phone] Patient: Eddie Faustin MRN: [id number] :1995 Visit Information Date & Time Provider Department Dept. Phone Encounter #  
 2018  3:00 PM MD Tristan Rosas [phone] [id number]  
  
 2018  3:00 PM  
OB VISIT with MD Tristan Rosas (Zeinab [place]) Appt Note: 4wk fob FW  
 [address] [facility] [address] [facility] [address] [phone]  
[address] Upcoming Health Maintenance Date Due Influenza Age 5 to Adult 2017 PAP AKA CERVICAL CYTOLOGY 2020 Allergies as of 2018  Review Complete On: 2018 By: Boogie Dennis MD  
 No Known Allergies Current Immunizations  Never Reviewed Name Date HPV (9-valent) 2016, 10/19/2015, 2015 10:07 AM  
  
 Not reviewed this visit You Were Diagnosed With   
  
 Codes Comments Encounter for supervision of normal first pregnancy in third trimester    -  Primary ICD-10-CM: Z34.03 
ICD-9-CM: V22.0 Vitals BP Weight(growth percentile) LMP BMI OB Status Smoking Status 110/72 215 lb (97.5 kg) 2017 (Exact Date) 38.09 kg/m2 Pregnant Never Smoker BMI and BSA Data Body Mass Index Body Surface Area 38.09 kg/m 2 2.08 m 2 Preferred Pharmacy Pharmacy Name Phone [address], [address] [place] [address] [address] [phone] Your Updated Medication List  
  
   
This list is accurate as of: 18  3:00 PM.  Always use your most recent med list.  
  
  
  
  
 doxylamine-pyridoxine (vit B6) 10-10 mg Tbec DR tablet Commonly known as:  Jona Aguilar Take 2 Tabs by mouth nightly. ondansetron hcl 8 mg tablet Commonly known as:  Xavier Cotter  
 Take 1 Tab by mouth every eight (8) hours as needed for Nausea. OTHER Birth Control medication. Unable to remember name Introducing [facility] & HEALTH SERVICES! Dear Kirs Julio: 
Thank you for requesting a Humouno account. Our records indicate that you already have an active Humouno account. You can access your account anytime at [email or web address] Harper-Swakum Corporation/Airu Did you know that you can access your hospital and ER discharge instructions at any time in Humouno? You can also review all of your test results from your hospital stay or ER visit. Additional Information If you have questions, please visit the Frequently Asked Questions section of the Humouno website at [email or web address]/[email or web address]/. Remember, Humouno is NOT to be used for urgent needs. For medical emergencies, dial 911. Now available from your iPhone and Android! Please provide this summary of care documentation to your next provider. Your primary care clinician is listed as Kate Rowe. If you have any questions after today's visit, please call [phone].

## 2018-03-13 ENCOUNTER — ROUTINE PRENATAL (OUTPATIENT)
Dept: OBGYN CLINIC | Age: 23
End: 2018-03-13

## 2018-03-13 VITALS — WEIGHT: 219 LBS | SYSTOLIC BLOOD PRESSURE: 120 MMHG | DIASTOLIC BLOOD PRESSURE: 84 MMHG | BODY MASS INDEX: 38.79 KG/M2

## 2018-03-13 DIAGNOSIS — Z23 ENCOUNTER FOR IMMUNIZATION: Primary | ICD-10-CM

## 2018-03-13 DIAGNOSIS — Z34.02 ENCOUNTER FOR SUPERVISION OF NORMAL FIRST PREGNANCY IN SECOND TRIMESTER: ICD-10-CM

## 2018-03-13 LAB
GTT, 1 HR, GLUCOLA, EXTERNAL: NORMAL
GTT, 1 HR, GLUCOLA, EXTERNAL: NORMAL

## 2018-03-13 NOTE — MR AVS SNAPSHOT
[address] [place] [address] 
[phone] Patient: Merari Rogers MRN: [id number] :1995 Visit Information Date & Time Provider Department Dept. Phone Encounter #  
 3/13/2018  8:50 AM Tanja Calle MD [facility] [phone] [id number] Upcoming Health Maintenance Date Due Influenza Age 5 to Adult 2017 OB 3RD TRIMESTER TDAP 3/6/2018 PAP AKA CERVICAL CYTOLOGY 2020 Allergies as of 3/13/2018  Review Complete On: 3/13/2018 By: Minnie Rousseau No Known Allergies Current Immunizations  Never Reviewed Name Date HPV (9-valent) 2016, 10/19/2015, 2015 10:07 AM  
  
 Not reviewed this visit You Were Diagnosed With   
  
 Codes Comments Encounter for supervision of normal first pregnancy in second trimester     ICD-10-CM: Z34.02 
ICD-9-CM: V22.0 Vitals BP Weight(growth percentile) LMP BMI OB Status Smoking Status 120/84 219 lb (99.3 kg) 2017 (Exact Date) 38.79 kg/m2 Pregnant Never Smoker BMI and BSA Data Body Mass Index Body Surface Area 38.79 kg/m 2 2.1 m 2 Preferred Pharmacy Pharmacy Name Phone [address], [address] [place] [address] [address] [facility] [address] [phone] Your Updated Medication List  
  
   
This list is accurate as of 3/13/18  9:11 AM.  Always use your most recent med list.  
  
  
  
  
 doxylamine-pyridoxine (vit B6) 10-10 mg Tbec DR tablet Commonly known as:  Silas Reesy Take 2 Tabs by mouth nightly. ondansetron hcl 8 mg tablet Commonly known as:  Finis Bunde Take 1 Tab by mouth every eight (8) hours as needed for Nausea. OTHER Birth Control medication. Unable to remember name Introducing [facility] & HEALTH SERVICES! Dear Kris Julio: 
Thank you for requesting a StepOne Healtht account.   Our records indicate that you already have an active QuotaDeck account. You can access your account anytime at https://Tonix Pharmaceuticals Holding. CytomX Therapeutics/Tonix Pharmaceuticals Holding Did you know that you can access your hospital and ER discharge instructions at any time in QuotaDeck? You can also review all of your test results from your hospital stay or ER visit. Additional Information If you have questions, please visit the Frequently Asked Questions section of the QuotaDeck website at https://Tonix Pharmaceuticals Holding. CytomX Therapeutics/Tonix Pharmaceuticals Holding/. Remember, QuotaDeck is NOT to be used for urgent needs. For medical emergencies, dial 911. Now available from your iPhone and Android! Please provide this summary of care documentation to your next provider. Your primary care clinician is listed as Kate Rowe. If you have any questions after today's visit, please call [phone].

## 2018-03-13 NOTE — PROGRESS NOTES
23year old  27 wod pregnant given the 0.5ml t-dap injection as per MD order. Patient signed consent. Patient tolerated the injection in her left deltoid with out complications.

## 2018-03-14 LAB
ERYTHROCYTE [DISTWIDTH] IN BLOOD BY AUTOMATED COUNT: 14.2 % (ref 12.3–15.4)
GLUCOSE 1H P 50 G GLC PO SERPL-MCNC: 139 MG/DL (ref 65–129)
HCT VFR BLD AUTO: 34.8 % (ref 34–46.6)
HGB BLD-MCNC: 11.3 G/DL (ref 11.1–15.9)
MCH RBC QN AUTO: 29.6 PG (ref 26.6–33)
MCHC RBC AUTO-ENTMCNC: 32.5 G/DL (ref 31.5–35.7)
MCV RBC AUTO: 91 FL (ref 79–97)
PLATELET # BLD AUTO: 252 X10E3/UL (ref 150–379)
RBC # BLD AUTO: 3.82 X10E6/UL (ref 3.77–5.28)
WBC # BLD AUTO: 10.3 X10E3/UL (ref 3.4–10.8)

## 2018-03-27 ENCOUNTER — ROUTINE PRENATAL (OUTPATIENT)
Dept: OBGYN CLINIC | Age: 23
End: 2018-03-27

## 2018-03-27 VITALS — DIASTOLIC BLOOD PRESSURE: 80 MMHG | BODY MASS INDEX: 39.15 KG/M2 | WEIGHT: 221 LBS | SYSTOLIC BLOOD PRESSURE: 114 MMHG

## 2018-03-27 DIAGNOSIS — Z34.03 ENCOUNTER FOR SUPERVISION OF NORMAL FIRST PREGNANCY IN THIRD TRIMESTER: Primary | ICD-10-CM

## 2018-03-27 NOTE — MR AVS SNAPSHOT
[address] [facility] [address] [facility] 
[phone] Patient: Niurka Jacobo MRN: [id number] :1995 Visit Information Date & Time Provider Department Dept. Phone Encounter #  
 3/27/2018  2:50 PM Randy Bo MD [facility] [phone] [id number] Upcoming Health Maintenance Date Due Influenza Age 5 to Adult 2017 PAP AKA CERVICAL CYTOLOGY 2020 Allergies as of 3/27/2018  Review Complete On: 3/27/2018 By: Julia Villegas No Known Allergies Current Immunizations  Never Reviewed Name Date HPV (9-valent) 2016, 10/19/2015, 2015 10:07 AM  
 Tdap 3/13/2018 Not reviewed this visit You Were Diagnosed With   
  
 Codes Comments Encounter for supervision of normal first pregnancy in third trimester    -  Primary ICD-10-CM: Z34.03 
ICD-9-CM: V22.0 Vitals BP Weight(growth percentile) LMP BMI OB Status Smoking Status 114/80 221 lb (100.2 kg) 2017 (Exact Date) 39.15 kg/m2 Pregnant Never Smoker BMI and BSA Data Body Mass Index Body Surface Area  
 39.15 kg/m 2 2.11 m 2 Preferred Pharmacy Pharmacy Name Phone [address], [address] [place] [address] [address] [facility] [address] [phone] Your Updated Medication List  
  
   
This list is accurate as of 3/27/18  3:16 PM.  Always use your most recent med list.  
  
  
  
  
 doxylamine-pyridoxine (vit B6) 10-10 mg Tbec DR tablet Commonly known as:  Luis Freedom Take 2 Tabs by mouth nightly. ondansetron hcl 8 mg tablet Commonly known as:  Deanne Gavin Take 1 Tab by mouth every eight (8) hours as needed for Nausea. OTHER Birth Control medication. Unable to remember name Introducing [facility] & HEALTH SERVICES! Dear Luna Kyle: 
Thank you for requesting a Dancing [employer].hart account.   Our records indicate that you already have an active Uniquedu account. You can access your account anytime at [email or web address] Did you know that you can access your hospital and ER discharge instructions at any time in Uniquedu? You can also review all of your test results from your hospital stay or ER visit. Additional Information If you have questions, please visit the Frequently Asked Questions section of the Uniquedu website at [email or web address] Remember, Uniquedu is NOT to be used for urgent needs. For medical emergencies, dial 911. Now available from your iPhone and Android! Please provide this summary of care documentation to your next provider. Your primary care clinician is listed as Kate Rowe. If you have any questions after today's visit, please call [phone].

## 2018-03-27 NOTE — LETTER
3/27/2018 3:17 PM 
 
Ms. Radha Stephens [phone] Due to pregnancy, it is not recommended Ms. Jordyn Ceballos do any heavy lifting over 25 pounds. Sincerely, Klarissa Joshi MD

## 2018-03-28 ENCOUNTER — LAB ONLY (OUTPATIENT)
Dept: OBGYN CLINIC | Age: 23
End: 2018-03-28

## 2018-03-28 DIAGNOSIS — R89.9 ABNORMAL LABORATORY TEST RESULT: ICD-10-CM

## 2018-03-28 DIAGNOSIS — Z34.03 ENCOUNTER FOR SUPERVISION OF NORMAL FIRST PREGNANCY IN THIRD TRIMESTER: Primary | ICD-10-CM

## 2018-03-28 LAB
GTT 120 MIN, EXTERNAL: 129
GTT 180 MIN, EXTERNAL: 136
GTT 60 MIN, EXTERNAL: 129
GTT, FASTING, EXTERNAL: 69

## 2018-03-29 LAB
GLUCOSE 1H P 100 G GLC PO SERPL-MCNC: 129 MG/DL (ref 65–179)
GLUCOSE 2H P 100 G GLC PO SERPL-MCNC: 129 MG/DL (ref 65–154)
GLUCOSE 3H P 100 G GLC PO SERPL-MCNC: 136 MG/DL (ref 65–139)
GLUCOSE P FAST SERPL-MCNC: 69 MG/DL (ref 65–94)
NOTE:, 102047: NORMAL

## 2018-03-30 ENCOUNTER — TELEPHONE (OUTPATIENT)
Dept: OBGYN CLINIC | Age: 23
End: 2018-03-30

## 2018-03-30 NOTE — TELEPHONE ENCOUNTER
Call received at 8:45 am      23year old   30w3d pregnant patient last seen in the office on 3/27/18. Patient denies vaginal bleeding, ROM , contractions and reports positive fetal movement. Patient returning call from yesterday. Patient advised of MD reviewed labs and verbalized understanding.

## 2018-04-10 ENCOUNTER — ROUTINE PRENATAL (OUTPATIENT)
Dept: OBGYN CLINIC | Age: 23
End: 2018-04-10

## 2018-04-10 VITALS — DIASTOLIC BLOOD PRESSURE: 78 MMHG | BODY MASS INDEX: 39.33 KG/M2 | WEIGHT: 222 LBS | SYSTOLIC BLOOD PRESSURE: 116 MMHG

## 2018-04-10 DIAGNOSIS — Z34.03 ENCOUNTER FOR SUPERVISION OF NORMAL FIRST PREGNANCY IN THIRD TRIMESTER: Primary | ICD-10-CM

## 2018-04-10 NOTE — MR AVS SNAPSHOT
[address] 
[phone] Patient: Ольга Tiwari MRN: [id number] :1995 Visit Information Date & Time Provider Department Dept. Phone Encounter #  
 4/10/2018  2:40 PM MD Tristan Prather [phone] [id number] Your Appointments 2018  8:30 AM  
ULTRASOUND with CARMELINA Quiñones [facility] Appt Note: u/s (growth) then 2wk fob FW  
 [address] [facility] [address] [place] [address]  
[phone]  
  
   
 [address] [place] [address]  
  
    
  
 2018  9:00 AM  
OB VISIT with MD Tristan Prather ([facility]) Appt Note: u/s (growth) then 2wk fob FW  
 [address] [facility] [address] [facility] [address]  
[address] Upcoming Health Maintenance Date Due Influenza Age 5 to Adult 2017 PAP AKA CERVICAL CYTOLOGY 2020 Allergies as of 4/10/2018  Review Complete On: 4/10/2018 By: Alecia Denton No Known Allergies Current Immunizations  Never Reviewed Name Date HPV (9-valent) 2016, 10/19/2015, 2015 10:07 AM  
 Tdap 3/13/2018 Not reviewed this visit Vitals BP Weight(growth percentile) LMP BMI OB Status Smoking Status 116/78 222 lb (100.7 kg) 2017 (Exact Date) 39.33 kg/m2 Pregnant Never Smoker BMI and BSA Data Body Mass Index Body Surface Area  
 39.33 kg/m 2 2.12 m 2 Preferred Pharmacy Pharmacy Name Phone [address], [facility] [address] [facility] [address] [phone] Your Updated Medication List  
  
   
This list is accurate as of 4/10/18  2:44 PM.  Always use your most recent med list.  
  
  
  
  
 doxylamine-pyridoxine (vit B6) 10-10 mg Tbec DR tablet Commonly known as:  Annabelle Holman Take 2 Tabs by mouth nightly. ondansetron hcl 8 mg tablet Commonly known as:  Long Dao Take 1 Tab by mouth every eight (8) hours as needed for Nausea. OTHER Birth Control medication. Unable to remember name Introducing \[facility]\"" & HEALTH SERVICES! Dear Jocelyn Starr: 
Thank you for requesting a [employer] account. Our records indicate that you already have an active [employer] account. You can access your account anytime at [email or web address] Did you know that you can access your hospital and ER discharge instructions at any time in OYCO Systems? You can also review all of your test results from your hospital stay or ER visit. Additional Information If you have questions, please visit the Frequently Asked Questions section of the OY[employer] Systems website at [email or web address] Remember, OYCO Systems is NOT to be used for urgent needs. For medical emergencies, dial 911. Now available from your iPhone and Android! Please provide this summary of care documentation to your next provider. Your primary care clinician is listed as Kate Rowe. If you have any questions after today's visit, please call [phone].

## 2018-04-23 ENCOUNTER — ROUTINE PRENATAL (OUTPATIENT)
Dept: OBGYN CLINIC | Age: 23
End: 2018-04-23

## 2018-04-23 VITALS — DIASTOLIC BLOOD PRESSURE: 76 MMHG | SYSTOLIC BLOOD PRESSURE: 120 MMHG | WEIGHT: 229 LBS | BODY MASS INDEX: 40.57 KG/M2

## 2018-04-23 DIAGNOSIS — Z34.03 ENCOUNTER FOR SUPERVISION OF NORMAL FIRST PREGNANCY IN THIRD TRIMESTER: Primary | ICD-10-CM

## 2018-04-23 NOTE — MR AVS SNAPSHOT
900 [facility] [address] [facility] 
[phone] Patient: Jasmin Mclean MRN: [id number] :1995 Visit Information Date & Time Provider Department Dept. Phone Encounter #  
 2018  9:00 AM Dimitris Joyce MD Tristan Sterling [phone] [id number] Upcoming Health Maintenance Date Due Influenza Age 5 to Adult 2017 PAP AKA CERVICAL CYTOLOGY 2020 Allergies as of 2018  Review Complete On: 2018 By: Stacey Mojica No Known Allergies Current Immunizations  Never Reviewed Name Date HPV (9-valent) 2016, 10/19/2015, 2015 10:07 AM  
 Tdap 3/13/2018 Not reviewed this visit Vitals BP Weight(growth percentile) LMP BMI OB Status Smoking Status 120/76 229 lb (103.9 kg) 2017 (Exact Date) 40.57 kg/m2 Pregnant Never Smoker BMI and BSA Data Body Mass Index Body Surface Area 40.57 kg/m 2 2.15 m 2 Preferred Pharmacy Pharmacy Name Phone [address], [facility] [address] [facility] [address] [phone] Your Updated Medication List  
  
   
This list is accurate as of 18  9:17 AM.  Always use your most recent med list.  
  
  
  
  
 doxylamine-pyridoxine (vit B6) 10-10 mg Tbec DR tablet Commonly known as:  Seleta Knack Take 2 Tabs by mouth nightly. ondansetron hcl 8 mg tablet Commonly known as:  [place] Peat Take 1 Tab by mouth every eight (8) hours as needed for Nausea. OTHER Birth Control medication. Unable to remember name [facility] [place] & [facility] SERVICES! Dear Miranda Boone: 
Thank you for requesting a [employer] account. Our records indicate that you already have an active [employer] account. You can access your account anytime at https://EnerTech Environmental. Agency Spotter/EnerTech Environmental Did you know that you can access your hospital and ER discharge instructions at any time in Derceto? You can also review all of your test results from your hospital stay or ER visit. Additional Information If you have questions, please visit the Frequently Asked Questions section of the D[employer] website at https://Mobidia Technology. Verimed/Clear Bookst/. Remember, Derceto is NOT to be used for urgent needs. For medical emergencies, dial 911. Now available from your iPhone and Android! Please provide this summary of care documentation to your next provider. Your primary care clinician is listed as Kate Rowe. If you have any questions after today's visit, please call [phone].

## 2018-05-07 ENCOUNTER — ROUTINE PRENATAL (OUTPATIENT)
Dept: OBGYN CLINIC | Age: 23
End: 2018-05-07

## 2018-05-07 VITALS — WEIGHT: 236 LBS | SYSTOLIC BLOOD PRESSURE: 128 MMHG | DIASTOLIC BLOOD PRESSURE: 84 MMHG | BODY MASS INDEX: 41.81 KG/M2

## 2018-05-07 DIAGNOSIS — Z34.03 ENCOUNTER FOR SUPERVISION OF NORMAL FIRST PREGNANCY IN THIRD TRIMESTER: ICD-10-CM

## 2018-05-07 DIAGNOSIS — Z34.83 PRENATAL CARE, SUBSEQUENT PREGNANCY IN THIRD TRIMESTER: Primary | ICD-10-CM

## 2018-05-07 RX ORDER — VALACYCLOVIR HYDROCHLORIDE 1 G/1
1000 TABLET, FILM COATED ORAL DAILY
Qty: 30 TAB | Refills: 2 | Status: SHIPPED | OUTPATIENT
Start: 2018-05-07

## 2018-05-07 NOTE — MR AVS SNAPSHOT
900 [facility] [address] 
[phone] Patient: Kameron Spencer MRN: [id number] :1995 Visit Information Date & Time Provider Department Dept. Phone Encounter #  
 2018  9:20 AM Rosio Quintanilla MD Tristan Sterling [phone] [id number] Upcoming Health Maintenance Date Due Influenza Age 5 to Adult 2018 PAP AKA CERVICAL CYTOLOGY 2020 Allergies as of 2018  Review Complete On: 2018 By: Olamide Segura No Known Allergies Current Immunizations  Never Reviewed Name Date HPV (9-valent) 2016, 10/19/2015, 2015 10:07 AM  
 Tdap 3/13/2018 Not reviewed this visit You Were Diagnosed With   
  
 Codes Comments Prenatal care, subsequent pregnancy in third trimester    -  Primary ICD-10-CM: Z34.83 ICD-9-CM: V22.1 Vitals BP Weight(growth percentile) LMP BMI OB Status Smoking Status 128/84 236 lb (107 kg) 2017 (Exact Date) 41.81 kg/m2 Pregnant Never Smoker BMI and BSA Data Body Mass Index Body Surface Area  
 41.81 kg/m 2 2.18 m 2 Preferred Pharmacy Pharmacy Name Phone [address], [facility] [address] [facility] [address] [phone] Your Updated Medication List  
  
   
This list is accurate as of 18  9:36 AM.  Always use your most recent med list.  
  
  
  
  
 doxylamine-pyridoxine (vit B6) 10-10 mg Tbec DR tablet Commonly known as:  Jaylin Chapin Take 2 Tabs by mouth nightly. ondansetron hcl 8 mg tablet Commonly known as:  [facility] Take 1 Tab by mouth every eight (8) hours as needed for Nausea. OTHER Birth Control medication. Unable to remember name We Performed the Following GROUP B STREP, JONI + REFLEX [[id number] Custom] Introducing [address]! Dear Loulou Smith: 
Thank you for requesting a [employer] account.   Our records indicate that you already have an active [employer] account. You can access your account anytime at https://Real Image Media Technologies. Donay/Real Image Media Technologies Did you know that you can access your hospital and ER discharge instructions at any time in [employer]? You can also review all of your test results from your hospital stay or ER visit. Additional Information If you have questions, please visit the Frequently Asked Questions section of the [employer] website at https://Real Image Media Technologies. Donay/Real Image Media Technologies/. Remember, [employer] is NOT to be used for urgent needs. For medical emergencies, dial 911. Now available from your iPhone and Android! Please provide this summary of care documentation to your next provider. Your primary care clinician is listed as Kate Rowe. If you have any questions after today's visit, please call [phone].

## 2018-05-09 LAB
GP B STREP DNA SPEC QL NAA+PROBE: NEGATIVE
GRBS, EXTERNAL: NEGATIVE

## 2018-05-14 ENCOUNTER — ROUTINE PRENATAL (OUTPATIENT)
Dept: OBGYN CLINIC | Age: 23
End: 2018-05-14

## 2018-05-14 VITALS — SYSTOLIC BLOOD PRESSURE: 120 MMHG | BODY MASS INDEX: 41.98 KG/M2 | DIASTOLIC BLOOD PRESSURE: 84 MMHG | WEIGHT: 237 LBS

## 2018-05-14 DIAGNOSIS — Z34.03 ENCOUNTER FOR SUPERVISION OF NORMAL FIRST PREGNANCY IN THIRD TRIMESTER: Primary | ICD-10-CM

## 2018-05-14 NOTE — MR AVS SNAPSHOT
900 [facility] [address] [facility] 
[phone] Patient: Jose David Fernandez MRN: [id number] :1995 Visit Information Date & Time Provider Department Dept. Phone Encounter #  
 2018  9:00 AM Bubba Lund MD Tristan Sterling [phone] [id number] Upcoming Health Maintenance Date Due Influenza Age 5 to Adult 2018 PAP AKA CERVICAL CYTOLOGY 2020 Allergies as of 2018  Review Complete On: 2018 By: Adams Beard No Known Allergies Current Immunizations  Never Reviewed Name Date HPV (9-valent) 2016, 10/19/2015, 2015 10:07 AM  
 Tdap 3/13/2018 Not reviewed this visit Vitals BP Weight(growth percentile) LMP BMI OB Status Smoking Status 120/84 237 lb (107.5 kg) 2017 (Exact Date) 41.98 kg/m2 Pregnant Never Smoker BMI and BSA Data Body Mass Index Body Surface Area 41.98 kg/m 2 2.19 m 2 Preferred Pharmacy Pharmacy Name Phone [address], [address] [facility] [facility] [address] [phone] Your Updated Medication List  
  
   
This list is accurate as of 18  9:05 AM.  Always use your most recent med list.  
  
  
  
  
 doxylamine-pyridoxine (vit B6) 10-10 mg Tbec DR tablet Commonly known as:  Cristofer Conn Take 2 Tabs by mouth nightly. ondansetron hcl 8 mg tablet Commonly known as:  Radha Maxin Take 1 Tab by mouth every eight (8) hours as needed for Nausea. OTHER Birth Control medication. Unable to remember name  
  
 valACYclovir 1 gram tablet Commonly known as:  VALTREX Take 1 Tab by mouth daily. Introducing [facility] & HEALTH SERVICES! Dear Ganesh Neely: 
Thank you for requesting a [employer] account. Our records indicate that you already have an active [employer] account. You can access your account anytime at [email or web address]. [email or web address] Did you know that you can access your hospital and ER discharge instructions at any time in Scopelec? You can also review all of your test results from your hospital stay or ER visit. Additional Information If you have questions, please visit the Frequently Asked Questions section of the Sc[employer]c website at [email or web address] Fit. thesixtyone/Rally Fit/. Remember, Scopelec is NOT to be used for urgent needs. For medical emergencies, dial 911. Now available from your iPhone and Android! Please provide this summary of care documentation to your next provider. Your primary care clinician is listed as Kate Rowe. If you have any questions after today's visit, please call [phone].

## 2018-05-21 ENCOUNTER — ROUTINE PRENATAL (OUTPATIENT)
Dept: OBGYN CLINIC | Age: 23
End: 2018-05-21

## 2018-05-21 VITALS — DIASTOLIC BLOOD PRESSURE: 80 MMHG | BODY MASS INDEX: 42.34 KG/M2 | WEIGHT: 239 LBS | SYSTOLIC BLOOD PRESSURE: 124 MMHG

## 2018-05-21 DIAGNOSIS — Z34.03 ENCOUNTER FOR SUPERVISION OF NORMAL FIRST PREGNANCY IN THIRD TRIMESTER: Primary | ICD-10-CM

## 2018-05-21 NOTE — MR AVS SNAPSHOT
900 [facility] [address] [facility] 
[phone] Patient: Patty Bey MRN: [id number] :1995 Visit Information Date & Time Provider Department Dept. Phone Encounter #  
 2018  2:10 PM Sinan Grant MD Tristan Sterling [phone] [id number] Upcoming Health Maintenance Date Due Influenza Age 5 to Adult 2018 PAP AKA CERVICAL CYTOLOGY 2020 Allergies as of 2018  Review Complete On: 2018 By: Romayne Bennetts No Known Allergies Current Immunizations  Never Reviewed Name Date HPV (9-valent) 2016, 10/19/2015, 2015 10:07 AM  
 Tdap 3/13/2018 Not reviewed this visit Vitals BP Weight(growth percentile) LMP BMI OB Status Smoking Status 124/80 239 lb (108.4 kg) 2017 (Exact Date) 42.34 kg/m2 Pregnant Never Smoker BMI and BSA Data Body Mass Index Body Surface Area  
 42.34 kg/m 2 2.2 m 2 Preferred Pharmacy Pharmacy Name Phone Td Goldberg [address] [place] [address], [facility] [address] [facility] [address] [phone] Your Updated Medication List  
  
   
This list is accurate as of 18  2:36 PM.  Always use your most recent med list.  
  
  
  
  
 doxylamine-pyridoxine (vit B6) 10-10 mg Tbec DR tablet Commonly known as:  Jessie Morgan Take 2 Tabs by mouth nightly. ondansetron hcl 8 mg tablet Commonly known as:  Thomasenia Ratel Take 1 Tab by mouth every eight (8) hours as needed for Nausea. OTHER Birth Control medication. Unable to remember name  
  
 valACYclovir 1 gram tablet Commonly known as:  VALTREX Take 1 Tab by mouth daily. Introducing [facility] & HEALTH SERVICES! Dear [facility]: 
Thank you for requesting a LC Style.[employer] account. Our records indicate that you already have an active LC Style.com account. You can access your account anytime at https://How do you roll?. qualifyor/How do you roll? Did you know that you can access your hospital and ER discharge instructions at any time in Vtion Wireless Technology? You can also review all of your test results from your hospital stay or ER visit. Additional Information If you have questions, please visit the Frequently Asked Questions section of the [employer] website at https://Refined Labs. Euroling/Refined Labs/. Remember, V[employer] Technology is NOT to be used for urgent needs. For medical emergencies, dial 911. Now available from your iPhone and Android! Please provide this summary of care documentation to your next provider. Your primary care clinician is listed as Kate Rowe. If you have any questions after today's visit, please call [phone].

## 2018-05-29 ENCOUNTER — ROUTINE PRENATAL (OUTPATIENT)
Dept: OBGYN CLINIC | Age: 23
End: 2018-05-29

## 2018-05-29 VITALS — BODY MASS INDEX: 42.62 KG/M2 | SYSTOLIC BLOOD PRESSURE: 122 MMHG | DIASTOLIC BLOOD PRESSURE: 82 MMHG | WEIGHT: 240.6 LBS

## 2018-05-29 DIAGNOSIS — Z34.03 ENCOUNTER FOR SUPERVISION OF NORMAL FIRST PREGNANCY IN THIRD TRIMESTER: Primary | ICD-10-CM

## 2018-06-05 ENCOUNTER — ROUTINE PRENATAL (OUTPATIENT)
Dept: OBGYN CLINIC | Age: 23
End: 2018-06-05

## 2018-06-05 VITALS — WEIGHT: 243 LBS | DIASTOLIC BLOOD PRESSURE: 78 MMHG | BODY MASS INDEX: 43.05 KG/M2 | SYSTOLIC BLOOD PRESSURE: 118 MMHG

## 2018-06-05 DIAGNOSIS — Z34.03 ENCOUNTER FOR SUPERVISION OF NORMAL FIRST PREGNANCY IN THIRD TRIMESTER: ICD-10-CM

## 2018-06-05 DIAGNOSIS — Z34.83 SUPERVISION OF NORMAL INTRAUTERINE PREGNANCY IN MULTIGRAVIDA, THIRD TRIMESTER: Primary | ICD-10-CM

## 2018-06-10 ENCOUNTER — TELEPHONE (OUTPATIENT)
Dept: OBGYN CLINIC | Age: 23
End: 2018-06-10

## 2018-06-10 ENCOUNTER — HOSPITAL ENCOUNTER (INPATIENT)
Age: 23
LOS: 3 days | Discharge: HOME | End: 2018-06-13
Attending: OBSTETRICS & GYNECOLOGY | Admitting: OBSTETRICS & GYNECOLOGY
Payer: COMMERCIAL

## 2018-06-10 DIAGNOSIS — R52 POSTPARTUM PAIN: Primary | ICD-10-CM

## 2018-06-10 PROBLEM — Z34.90 PREGNANCY: Status: ACTIVE | Noted: 2018-06-10

## 2018-06-10 LAB
BASOPHILS # BLD: 0 K/UL (ref 0–0.1)
BASOPHILS NFR BLD: 0 % (ref 0–1)
DIFFERENTIAL METHOD BLD: ABNORMAL
EOSINOPHIL # BLD: 0 K/UL (ref 0–0.4)
EOSINOPHIL NFR BLD: 0 % (ref 0–7)
ERYTHROCYTE [DISTWIDTH] IN BLOOD BY AUTOMATED COUNT: 14.6 % (ref 11.5–14.5)
HCT VFR BLD AUTO: 35.1 % (ref 35–47)
HGB BLD-MCNC: 11.5 G/DL (ref 11.5–16)
IMM GRANULOCYTES # BLD: 0.1 K/UL (ref 0–0.04)
IMM GRANULOCYTES NFR BLD AUTO: 1 % (ref 0–0.5)
LYMPHOCYTES # BLD: 2.5 K/UL (ref 0.8–3.5)
LYMPHOCYTES NFR BLD: 24 % (ref 12–49)
MCH RBC QN AUTO: 29.7 PG (ref 26–34)
MCHC RBC AUTO-ENTMCNC: 32.8 G/DL (ref 30–36.5)
MCV RBC AUTO: 90.7 FL (ref 80–99)
MONOCYTES # BLD: 0.7 K/UL (ref 0–1)
MONOCYTES NFR BLD: 7 % (ref 5–13)
NEUTS SEG # BLD: 7.2 K/UL (ref 1.8–8)
NEUTS SEG NFR BLD: 68 % (ref 32–75)
NRBC # BLD: 0 K/UL (ref 0–0.01)
NRBC BLD-RTO: 0 PER 100 WBC
PLATELET # BLD AUTO: 217 K/UL (ref 150–400)
PMV BLD AUTO: 12.7 FL (ref 8.9–12.9)
RBC # BLD AUTO: 3.87 M/UL (ref 3.8–5.2)
WBC # BLD AUTO: 10.5 K/UL (ref 3.6–11)

## 2018-06-10 PROCEDURE — 76060000078 HC EPIDURAL ANESTHESIA: Performed by: ANESTHESIOLOGY

## 2018-06-10 PROCEDURE — 85025 COMPLETE CBC W/AUTO DIFF WBC: CPT | Performed by: ADVANCED PRACTICE MIDWIFE

## 2018-06-10 PROCEDURE — 75410000002 HC LABOR FEE PER 1 HR: Performed by: OBSTETRICS & GYNECOLOGY

## 2018-06-10 PROCEDURE — 77010026065 HC OXYGEN MINIMUM MEDICAL AIR: Performed by: OBSTETRICS & GYNECOLOGY

## 2018-06-10 PROCEDURE — 65270000029 HC RM PRIVATE

## 2018-06-10 PROCEDURE — 75410000003 HC RECOV DEL/VAG/CSECN EA 0.5 HR: Performed by: OBSTETRICS & GYNECOLOGY

## 2018-06-10 PROCEDURE — 36415 COLL VENOUS BLD VENIPUNCTURE: CPT | Performed by: ADVANCED PRACTICE MIDWIFE

## 2018-06-10 PROCEDURE — 75410000000 HC DELIVERY VAGINAL/SINGLE: Performed by: OBSTETRICS & GYNECOLOGY

## 2018-06-10 PROCEDURE — 74011250636 HC RX REV CODE- 250/636: Performed by: ADVANCED PRACTICE MIDWIFE

## 2018-06-10 PROCEDURE — 99283 EMERGENCY DEPT VISIT LOW MDM: CPT

## 2018-06-10 RX ORDER — SODIUM CHLORIDE, SODIUM LACTATE, POTASSIUM CHLORIDE, CALCIUM CHLORIDE 600; 310; 30; 20 MG/100ML; MG/100ML; MG/100ML; MG/100ML
125 INJECTION, SOLUTION INTRAVENOUS CONTINUOUS
Status: DISCONTINUED | OUTPATIENT
Start: 2018-06-11 | End: 2018-06-11

## 2018-06-10 RX ORDER — SODIUM CHLORIDE 0.9 % (FLUSH) 0.9 %
5-10 SYRINGE (ML) INJECTION AS NEEDED
Status: DISCONTINUED | OUTPATIENT
Start: 2018-06-10 | End: 2018-06-11

## 2018-06-10 RX ORDER — SODIUM CHLORIDE, SODIUM LACTATE, POTASSIUM CHLORIDE, CALCIUM CHLORIDE 600; 310; 30; 20 MG/100ML; MG/100ML; MG/100ML; MG/100ML
999 INJECTION, SOLUTION INTRAVENOUS CONTINUOUS
Status: DISCONTINUED | OUTPATIENT
Start: 2018-06-11 | End: 2018-06-11

## 2018-06-10 RX ORDER — SODIUM CHLORIDE 0.9 % (FLUSH) 0.9 %
5-10 SYRINGE (ML) INJECTION EVERY 8 HOURS
Status: DISCONTINUED | OUTPATIENT
Start: 2018-06-11 | End: 2018-06-11

## 2018-06-10 RX ORDER — NALOXONE HYDROCHLORIDE 0.4 MG/ML
0.4 INJECTION, SOLUTION INTRAMUSCULAR; INTRAVENOUS; SUBCUTANEOUS AS NEEDED
Status: DISCONTINUED | OUTPATIENT
Start: 2018-06-10 | End: 2018-06-11

## 2018-06-10 RX ORDER — ACETAMINOPHEN 325 MG/1
650 TABLET ORAL
Status: DISCONTINUED | OUTPATIENT
Start: 2018-06-10 | End: 2018-06-11

## 2018-06-10 RX ORDER — MINERAL OIL
OIL (ML) ORAL ONCE
Status: ACTIVE | OUTPATIENT
Start: 2018-06-11 | End: 2018-06-11

## 2018-06-10 RX ORDER — HYDROCODONE BITARTRATE AND ACETAMINOPHEN 5; 325 MG/1; MG/1
1 TABLET ORAL
Status: DISCONTINUED | OUTPATIENT
Start: 2018-06-10 | End: 2018-06-11

## 2018-06-10 RX ORDER — MAG HYDROX/ALUMINUM HYD/SIMETH 200-200-20
30 SUSPENSION, ORAL (FINAL DOSE FORM) ORAL
Status: DISCONTINUED | OUTPATIENT
Start: 2018-06-10 | End: 2018-06-11

## 2018-06-10 RX ADMIN — SODIUM CHLORIDE, SODIUM LACTATE, POTASSIUM CHLORIDE, AND CALCIUM CHLORIDE 999 ML/HR: 600; 310; 30; 20 INJECTION, SOLUTION INTRAVENOUS at 23:25

## 2018-06-10 NOTE — IP AVS SNAPSHOT
[address] 
[phone] Patient: Magaly Marin MRN: [id number] :1995 A check earl indicates which time of day the medication should be taken. My Medications ASK your doctor about these medications Instructions Each Dose to Equal  
 Morning Noon Evening Bedtime  
 doxylamine-pyridoxine (vit B6) 10-10 mg Tbec DR tablet Commonly known as:  Jacilee Bleacher Your last dose was: Your next dose is: Take 2 Tabs by mouth nightly. 2 Tab  
    
   
   
   
  
 ondansetron hcl 8 mg tablet Commonly known as:  Meng [place] Your last dose was: Your next dose is: Take 1 Tab by mouth every eight (8) hours as needed for Nausea. 8 mg OTHER Your last dose was: Your next dose is:    
   
   
 Birth Control medication. Unable to remember name  
     
   
   
   
  
 valACYclovir 1 gram tablet Commonly known as:  VALTREX Your last dose was: Your next dose is: Take 1 Tab by mouth daily.   
 1000 mg

## 2018-06-10 NOTE — IP AVS SNAPSHOT
Jr Armstrong 
 
 
 [address] [facility] [address] [facility] 
[phone] Patient: Guillermo Underwood MRN: [id number] :1995 About your hospitalization You were admitted on:  Kathrine 10, 2018 [facility] [address] You were discharged on:  2018 Why you were hospitalized Your primary diagnosis was:  Not on File Your diagnoses also included:  Pregnancy Follow-up Information None Discharge Orders None A check earl indicates which time of day the medication should be taken. My Medications ASK your doctor about these medications Instructions Each Dose to Equal  
 Morning Noon Evening Bedtime  
 doxylamine-pyridoxine (vit B6) 10-10 mg Tbec DR tablet Commonly known as:  Annabelle Ebbs Your last dose was: Your next dose is: Take 2 Tabs by mouth nightly. 2 Tab  
    
   
   
   
  
 ondansetron hcl 8 mg tablet Commonly known as:  Delbra Castor Your last dose was: Your next dose is: Take 1 Tab by mouth every eight (8) hours as needed for Nausea. 8 mg OTHER Your last dose was: Your next dose is:    
   
   
 Birth Control medication. Unable to remember name  
     
   
   
   
  
 valACYclovir 1 gram tablet Commonly known as:  VALTREX Your last dose was: Your next dose is: Take 1 Tab by mouth daily. 1000 mg Discharge Instructions Patient Discharge Instructions Guillermo Underwood / [id number] : 1995 Admitted 6/10/2018 Discharged: 2018 Personal Items Please collect from your nurse all clothing which belongs to you. Please collect from the  any valuables you stored during your stay, and please remember all of your personal items, such as dentures, canes, and eyeglasses. Activity Instructions You must avoid lifting more than 10 pounds until your physician instructs you differently. You should avoid driving, climbing stairs, sexual activity, tub baths. You may resume driving, climbing stairs, sexual activity and tub baths. I understand that if any problems occur once I am at home I am to contact my physician. I understand and acknowledge receipt of the instructions indicated above. Introducing hospitals & HEALTH SERVICES! Dear Jocelyn Starr: 
Thank you for requesting a Taxify account. Our records indicate that you already have an active Taxify account. You can access your account anytime at [email or web address] Did you know that you can access your hospital and ER discharge instructions at any time in Taxify? You can also review all of your test results from your hospital stay or ER visit. Additional Information If you have questions, please visit the Frequently Asked Questions section of the Taxify website at https://ASAN Security Technologies/Complix/. Remember, Taxify is NOT to be used for urgent needs. For medical emergencies, dial 911. Now available from your iPhone and Android! Introducing Yonny Muñoz As a Kriste Peek patient, I wanted to make you aware of our electronic visit tool called Yonny Muñoz. Teresa Priceek 24/7 allows you to connect within minutes with a medical provider 24 hours a day, seven days a week via a mobile device or tablet or logging into a secure website from your computer. You can access Yonny Ocdirkfin from anywhere in the [place]. A virtual visit might be right for you when you have a simple condition and feel like you just dont want to get out of bed, or cant get away from work for an appointment, when your regular Kriste Peek provider is not available (evenings, weekends or holidays), or when youre out of town and need minor care.   Electronic visits cost only $49 and if the [facility] 24/7 provider determines a prescription is needed to treat your condition, one can be electronically transmitted to a nearby pharmacy*. Please take a moment to enroll today if you have not already done so. The enrollment process is free and takes just a few minutes. To enroll, please download the "Blinkfire [employer] 24/7 shanita to your tablet or phone, or visit [email or web address] to enroll on your computer. And, as an [address] [place] [address] patient with a InstantMarketing account, the results of your visits will be scanned into your electronic medical record and your primary care provider will be able to view the scanned results. We urge you to continue to see your regular "[employer] provider for your ongoing medical care. And while your primary care provider may not be the one available when you seek a Dealer Ignitiondirkfin virtual visit, the peace of mind you get from getting a real diagnosis real time can be priceless. For more information on Shustir, view our Frequently Asked Questions (FAQs) at [email or web address]. Sincerely, 
 
Berna Warren MD 
Chief Medical Officer [address] [place] [address] *:  certain medications cannot be prescribed via Shustir Unresulted tests-please follow up with your PCP on these results Procedure/Test Authorizing Provider CBC WITH AUTOMATED DIFF Marlena Turcios CNM Providers Seen During Your Hospitalization Provider Specialty Primary office phone Melanie Curtis MD Obstetrics & Gynecology [phone] Your Primary Care Physician (PCP) Primary Care Physician Office Phone Office Fax MARTÍN GRAY ** None ** ** None ** You are allergic to the following No active allergies Recent Documentation Breastfeeding? OB Status Smoking Status Unknown Recent pregnancy Never Smoker Emergency Contacts Name Discharge Info Relation Home Work Mobile [address] CAREGIVER [3] Parent [1] [phone] LettyGutierrez morales DISCHARGE CAREGIVER [3] Boyfriend [17] [phone] Patient Belongings The following personal items are in your possession at time of discharge: 
  Dental Appliances: None  Visual Aid: None      Home Medications: None   Jewelry: Earrings  Clothing: At bedside    Other Valuables: Cell Phone  Personal Items Sent to Safe: none Please provide this summary of care documentation to your next provider. Signatures-by signing, you are acknowledging that this After Visit Summary has been reviewed with you and you have received a copy. Patient Signature:  ____________________________________________________________ Date:  ____________________________________________________________  
  
[facility] Provider Signature:  ____________________________________________________________ Date:  ____________________________________________________________

## 2018-06-11 ENCOUNTER — ANESTHESIA (OUTPATIENT)
Dept: LABOR AND DELIVERY | Age: 23
End: 2018-06-11
Payer: COMMERCIAL

## 2018-06-11 ENCOUNTER — ANESTHESIA EVENT (OUTPATIENT)
Dept: LABOR AND DELIVERY | Age: 23
End: 2018-06-11
Payer: COMMERCIAL

## 2018-06-11 PROCEDURE — 74011250636 HC RX REV CODE- 250/636: Performed by: OBSTETRICS & GYNECOLOGY

## 2018-06-11 PROCEDURE — 65270000029 HC RM PRIVATE

## 2018-06-11 PROCEDURE — 74011250637 HC RX REV CODE- 250/637: Performed by: OBSTETRICS & GYNECOLOGY

## 2018-06-11 PROCEDURE — 77030010848 HC CATH INTUTR PRSS KOLB -B

## 2018-06-11 PROCEDURE — 74011250636 HC RX REV CODE- 250/636: Performed by: ADVANCED PRACTICE MIDWIFE

## 2018-06-11 PROCEDURE — 59025 FETAL NON-STRESS TEST: CPT

## 2018-06-11 PROCEDURE — 75410000002 HC LABOR FEE PER 1 HR: Performed by: OBSTETRICS & GYNECOLOGY

## 2018-06-11 PROCEDURE — 77030018749 HC HK AMNIO DISP DERY -A

## 2018-06-11 PROCEDURE — 77030014125 HC TY EPDRL BBMI -B: Performed by: ANESTHESIOLOGY

## 2018-06-11 PROCEDURE — 77030011943

## 2018-06-11 PROCEDURE — 74011000250 HC RX REV CODE- 250

## 2018-06-11 PROCEDURE — 10907ZC DRAINAGE OF AMNIOTIC FLUID, THERAPEUTIC FROM PRODUCTS OF CONCEPTION, VIA NATURAL OR ARTIFICIAL OPENING: ICD-10-PCS | Performed by: OBSTETRICS & GYNECOLOGY

## 2018-06-11 PROCEDURE — 77030034850

## 2018-06-11 PROCEDURE — 74011250636 HC RX REV CODE- 250/636

## 2018-06-11 PROCEDURE — 0DQP0ZZ REPAIR RECTUM, OPEN APPROACH: ICD-10-PCS | Performed by: OBSTETRICS & GYNECOLOGY

## 2018-06-11 PROCEDURE — 74011250636 HC RX REV CODE- 250/636: Performed by: ANESTHESIOLOGY

## 2018-06-11 PROCEDURE — 51702 INSERT TEMP BLADDER CATH: CPT

## 2018-06-11 RX ORDER — FENTANYL/BUPIVACAINE/NS/PF 2-1250MCG
1-16 PREFILLED PUMP RESERVOIR EPIDURAL CONTINUOUS
Status: DISCONTINUED | OUTPATIENT
Start: 2018-06-11 | End: 2018-06-11

## 2018-06-11 RX ORDER — ONDANSETRON 4 MG/1
4 TABLET, ORALLY DISINTEGRATING ORAL
Status: ACTIVE | OUTPATIENT
Start: 2018-06-11 | End: 2018-06-12

## 2018-06-11 RX ORDER — ONDANSETRON 2 MG/ML
4 INJECTION INTRAMUSCULAR; INTRAVENOUS
Status: DISCONTINUED | OUTPATIENT
Start: 2018-06-11 | End: 2018-06-11

## 2018-06-11 RX ORDER — DOCUSATE SODIUM 100 MG/1
100 CAPSULE, LIQUID FILLED ORAL 2 TIMES DAILY
Status: DISCONTINUED | OUTPATIENT
Start: 2018-06-11 | End: 2018-06-13 | Stop reason: HOSPADM

## 2018-06-11 RX ORDER — BUPIVACAINE HYDROCHLORIDE 2.5 MG/ML
INJECTION, SOLUTION EPIDURAL; INFILTRATION; INTRACAUDAL AS NEEDED
Status: DISCONTINUED | OUTPATIENT
Start: 2018-06-11 | End: 2018-06-12 | Stop reason: HOSPADM

## 2018-06-11 RX ORDER — ONDANSETRON 2 MG/ML
INJECTION INTRAMUSCULAR; INTRAVENOUS
Status: COMPLETED
Start: 2018-06-11 | End: 2018-06-11

## 2018-06-11 RX ORDER — ZOLPIDEM TARTRATE 5 MG/1
5 TABLET ORAL
Status: DISCONTINUED | OUTPATIENT
Start: 2018-06-11 | End: 2018-06-13 | Stop reason: HOSPADM

## 2018-06-11 RX ORDER — SWAB
1 SWAB, NON-MEDICATED MISCELLANEOUS DAILY
Status: DISCONTINUED | OUTPATIENT
Start: 2018-06-12 | End: 2018-06-13 | Stop reason: HOSPADM

## 2018-06-11 RX ORDER — OXYTOCIN/RINGER'S LACTATE 20/1000 ML
125-500 PLASTIC BAG, INJECTION (ML) INTRAVENOUS ONCE
Status: ACTIVE | OUTPATIENT
Start: 2018-06-11 | End: 2018-06-12

## 2018-06-11 RX ORDER — HYDROCORTISONE ACETATE PRAMOXINE HCL 2.5; 1 G/100G; G/100G
CREAM TOPICAL AS NEEDED
Status: DISCONTINUED | OUTPATIENT
Start: 2018-06-11 | End: 2018-06-13 | Stop reason: HOSPADM

## 2018-06-11 RX ORDER — OXYTOCIN IN 5 % DEXTROSE 30/500 ML
.5-2 PLASTIC BAG, INJECTION (ML) INTRAVENOUS
Status: DISCONTINUED | OUTPATIENT
Start: 2018-06-11 | End: 2018-06-11

## 2018-06-11 RX ORDER — SIMETHICONE 80 MG
80 TABLET,CHEWABLE ORAL
Status: DISCONTINUED | OUTPATIENT
Start: 2018-06-11 | End: 2018-06-13 | Stop reason: HOSPADM

## 2018-06-11 RX ORDER — LIDOCAINE HYDROCHLORIDE AND EPINEPHRINE 20; 5 MG/ML; UG/ML
INJECTION, SOLUTION EPIDURAL; INFILTRATION; INTRACAUDAL; PERINEURAL AS NEEDED
Status: DISCONTINUED | OUTPATIENT
Start: 2018-06-11 | End: 2018-06-12 | Stop reason: HOSPADM

## 2018-06-11 RX ORDER — DIPHENHYDRAMINE HCL 25 MG
25 CAPSULE ORAL
Status: DISCONTINUED | OUTPATIENT
Start: 2018-06-11 | End: 2018-06-13 | Stop reason: HOSPADM

## 2018-06-11 RX ORDER — TERBUTALINE SULFATE 1 MG/ML
INJECTION SUBCUTANEOUS
Status: DISCONTINUED
Start: 2018-06-11 | End: 2018-06-11

## 2018-06-11 RX ORDER — OXYCODONE AND ACETAMINOPHEN 5; 325 MG/1; MG/1
1 TABLET ORAL
Status: DISCONTINUED | OUTPATIENT
Start: 2018-06-11 | End: 2018-06-13 | Stop reason: HOSPADM

## 2018-06-11 RX ORDER — IBUPROFEN 800 MG/1
800 TABLET ORAL EVERY 8 HOURS
Status: DISCONTINUED | OUTPATIENT
Start: 2018-06-11 | End: 2018-06-13 | Stop reason: HOSPADM

## 2018-06-11 RX ORDER — ONDANSETRON 2 MG/ML
4 INJECTION INTRAMUSCULAR; INTRAVENOUS ONCE
Status: COMPLETED | OUTPATIENT
Start: 2018-06-11 | End: 2018-06-11

## 2018-06-11 RX ORDER — EPHEDRINE SULFATE 50 MG/ML
10 INJECTION, SOLUTION INTRAVENOUS
Status: DISCONTINUED | OUTPATIENT
Start: 2018-06-11 | End: 2018-06-11

## 2018-06-11 RX ORDER — NALOXONE HYDROCHLORIDE 0.4 MG/ML
0.4 INJECTION, SOLUTION INTRAMUSCULAR; INTRAVENOUS; SUBCUTANEOUS AS NEEDED
Status: DISCONTINUED | OUTPATIENT
Start: 2018-06-11 | End: 2018-06-13 | Stop reason: HOSPADM

## 2018-06-11 RX ADMIN — Medication 2 MILLI-UNITS/MIN: at 11:16

## 2018-06-11 RX ADMIN — SODIUM CHLORIDE, SODIUM LACTATE, POTASSIUM CHLORIDE, AND CALCIUM CHLORIDE 999 ML/HR: 600; 310; 30; 20 INJECTION, SOLUTION INTRAVENOUS at 00:24

## 2018-06-11 RX ADMIN — ONDANSETRON 4 MG: 2 INJECTION INTRAMUSCULAR; INTRAVENOUS at 01:33

## 2018-06-11 RX ADMIN — BUPIVACAINE HYDROCHLORIDE 10 ML: 2.5 INJECTION, SOLUTION EPIDURAL; INFILTRATION; INTRACAUDAL at 03:10

## 2018-06-11 RX ADMIN — Medication 12 ML/HR: at 04:24

## 2018-06-11 RX ADMIN — SODIUM CHLORIDE, SODIUM LACTATE, POTASSIUM CHLORIDE, AND CALCIUM CHLORIDE 125 ML/HR: 600; 310; 30; 20 INJECTION, SOLUTION INTRAVENOUS at 12:08

## 2018-06-11 RX ADMIN — Medication 14 ML/HR: at 12:45

## 2018-06-11 RX ADMIN — BUPIVACAINE HYDROCHLORIDE 10 ML: 2.5 INJECTION, SOLUTION EPIDURAL; INFILTRATION; INTRACAUDAL at 09:22

## 2018-06-11 RX ADMIN — Medication 12 ML/HR: at 00:56

## 2018-06-11 RX ADMIN — SODIUM CHLORIDE, SODIUM LACTATE, POTASSIUM CHLORIDE, AND CALCIUM CHLORIDE 125 ML/HR: 600; 310; 30; 20 INJECTION, SOLUTION INTRAVENOUS at 04:29

## 2018-06-11 RX ADMIN — DOCUSATE SODIUM 100 MG: 100 CAPSULE, LIQUID FILLED ORAL at 18:00

## 2018-06-11 RX ADMIN — OXYCODONE HYDROCHLORIDE AND ACETAMINOPHEN 1 TABLET: 5; 325 TABLET ORAL at 18:00

## 2018-06-11 RX ADMIN — ONDANSETRON 4 MG: 2 INJECTION INTRAMUSCULAR; INTRAVENOUS at 09:11

## 2018-06-11 RX ADMIN — Medication 12 ML/HR: at 07:45

## 2018-06-11 RX ADMIN — LIDOCAINE HYDROCHLORIDE AND EPINEPHRINE 10 ML: 20; 5 INJECTION, SOLUTION EPIDURAL; INFILTRATION; INTRACAUDAL; PERINEURAL at 00:48

## 2018-06-11 RX ADMIN — IBUPROFEN 800 MG: 800 TABLET ORAL at 18:00

## 2018-06-11 RX ADMIN — OXYCODONE HYDROCHLORIDE AND ACETAMINOPHEN 1 TABLET: 5; 325 TABLET ORAL at 22:30

## 2018-06-11 RX ADMIN — LIDOCAINE HYDROCHLORIDE AND EPINEPHRINE 6 ML: 20; 5 INJECTION, SOLUTION EPIDURAL; INFILTRATION; INTRACAUDAL; PERINEURAL at 08:53

## 2018-06-11 RX ADMIN — LACTULOSE 20 G: 10 SOLUTION ORAL at 23:12

## 2018-06-11 NOTE — L&D DELIVERY NOTE
Delivery Summary    Patient: Kristen Driver MRN: [id number]  SSN: [id number]    YOB: 1995  Age: 23 y.o. Sex: female        Labor Events:    Labor: No    Rupture Date: 2018    Rupture Time: 8:12 AM    Rupture Type AROM    Amniotic Fluid Volume:      Amniotic Fluid Description: Meconium  None    Induction: None        Augmentation: AROM; Oxytocin    Labor Complications: None     Additional Complications:        Cervical Ripening:       None      Delivery Events:  Episiotomy: None    Laceration(s): Fourth degree perineal      Repaired: Yes     Number of Repair Packets: 7    Suture Type and Size:         Estimated Blood Loss (ml): 400        Information for the patient's newbornIsela Brine, Male [[id number]]     Delivery Summary - Baby    Delivery Date: 2018   Delivery Time: 3:17 PM   Delivery Type: Vaginal, Spontaneous Delivery  Sex:  male  Gestational Age: 39w6d  Delivery Clinician:  Joann Burks  Living?: Living   Delivery Location: &D 202           APGARS  One minute Five minutes Ten minutes   Skin Color: 1    1       Heart Rate: 2   2         Reflex Irritability: 2   2         Muscle Tone: 2   2       Respiration: 2   2         Total: 9   9           Presentation: Vertex  Position: Left Occiput Anterior  Resuscitation Method:  Suctioning-bulb; Tactile Stimulation;Suctioning-deep     Meconium Stained: Thin    Cord Information: 3 Vessels   Complications: Nuchal Cord Without Compressions  Cord Blood Sent?:  No    Blood Gases Sent?:  No    Placenta:  Date/Time:   3:20 PM  Removal: Spontaneous      Appearance: Normal;Intact      Measurements:  Birth Weight:      Birth Length:     Head Circumference:       Chest Circumference:      Abdominal Girth:       Other Providers:   THEA JEREZ;MAGAN TROY;SULAIMAN JAQUEZ PAMELA L;ADRIANE WILLARD;FERN LYONS Obstetrician;Primary Nurse;Primary [place] Nurse;Charge Nurse;Nursery Nurse;Tech           Repair:  Pt with tear on left SW during pushing.  no epis. Bilateral SW lacs with hematoma on left repaired with 3-0 vicryl. 4th degree repaired with 4-0 chromic and 3-0 vicryl. Sphincter repaired with 2-0 vicryl. Labial lac on left repaired with 3-0 vicryl on SH. Pelaez placed until am.   Counts correct.

## 2018-06-11 NOTE — PROGRESS NOTES
2227 - Arrived to Labor and delivery, assisted to room, in bathroom changing. Martinu 4724 on monitor    2300 - Spoke with ADOLPH Salgado regarding patient complaints and sve, history of hsv. To come to bedside for spec exam    2310 - ADOLPH Hampton at bedside to assess patient and discuss plan of care, speculum exam done by ADOLPH Garza no lesions visualized. Patient desires to go without epidural    0022 - Patient now desires epidural. Preparing patient for epidural    0303 - Patients epidural redosed. 2355 - Bedside and Verbal shift change report given to Zohaib Carey (oncoming nurse) by Christa Barragan (offgoing nurse). Report included the following information SBAR, Intake/Output, MAR and Recent Results.

## 2018-06-11 NOTE — ANESTHESIA PROCEDURE NOTES
Epidural Block    Start time: 6/11/2018 12:32 AM  End time: 6/11/2018 12:51 AM  Performed by: Sergei Bey  Authorized by: Sergei Bey     Pre-Procedure  Indication: labor epidural    Preanesthetic Checklist: patient identified, risks and benefits discussed, anesthesia consent, patient being monitored, timeout performed and anesthesia consent    Timeout Time: 00:32        Epidural:   Patient position:  Seated  Prep region:  Lumbar  Prep: Betadine    Location:  L3-4    Needle and Epidural Catheter:   Needle Type:  Tuohy  Needle Gauge:  17 G  Injection Technique:  Loss of resistance using air  Attempts:  1  Catheter Size:  20 G  Catheter at Skin Depth (cm):  14  Depth in Epidural Space (cm):  5  Events: no blood with aspiration, no cerebrospinal fluid with aspiration and negative aspiration test    Test Dose:  Lidocaine 1.5% w/ epi and negative    Assessment:   Catheter Secured:  Tape  Insertion:  Uncomplicated  Patient tolerance:  Patient tolerated the procedure well with no immediate complications

## 2018-06-11 NOTE — PROGRESS NOTES
NST Inpatient procedure note    Liat Oates presents for fetal non-stress test.     Indication is early labor. She is 40w5d. She has been monitored for 20 minutes. The FHR was reactive. NST Interpretation:    FHR baseline 125 bpm, variability moderate. Accelerations present. Decelerations Absent. Uterine contractions were q 2 minutes. Assessment    NST is reactive. NST is reassuring. Patient does need admission for further monitoring due to gestational age and pain with frequent contractions. Mery Terence was informed of the NST results and her questions were answered.     Plan:    - Admit to L&D for early labor and contractions post term

## 2018-06-11 NOTE — PROGRESS NOTES
Labor Progress Note  Patient seen, fetal heart rate and contraction pattern evaluated, patient examined. Visit Vitals    BP (!) 135/97    Pulse 99    Temp 98.1 °F (36.7 °C)    Resp 16    LMP 08/23/2017 (Exact Date)    SpO2 99%    Breastfeeding No       Physical Exam:  Cervical Exam:  5/100 %/-2/Anterior  Membranes:  Artificial Rupture of Membranes;  Amniotic Fluid: medium amount of thin meconium fluid  Uterine Activity: Frequency: Every 2-3 minutes  Fetal Heart Rate: Reactive    Assessment/Plan:  Reassuring fetal status, Continue plan for vaginal delivery   IUPC and FSE placed  Anesthesia to replace epidural

## 2018-06-11 NOTE — TELEPHONE ENCOUNTER
MD ON CALL, PHONE [facility]    Date:     Pregnant: Yes  23 y.o.  40w5d     Attending:   Dr. Jus Aly    Reason for call:  ? Mucus plug came out    Plan:  Labor/ROM prec. Rec to L and D if persistent LOF    I discussed the option of ER evaluation if the symptoms are severe or do not improve or if the patient wants a more thorough evaluation of her concerns that can not be provided over the phone. I advised the caller to contact the office if they have any further questions or concerns.     Collette Bair, MD

## 2018-06-11 NOTE — ANESTHESIA PROCEDURE NOTES
Epidural Block    Start time: 6/11/2018 9:12 AM  End time: 6/11/2018 9:22 AM  Performed by: Kiki Valles  Authorized by: Kiki Valles     Pre-Procedure  Indication: labor epidural    Preanesthetic Checklist: patient identified, risks and benefits discussed, anesthesia consent, timeout performed and anesthesia consent      Epidural:   Patient position:  Seated  Prep region:  Lumbar  Prep: Betadine    Location:  L3-4    Needle and Epidural Catheter:   Needle Type:  Tuohy  Needle Gauge:  17 G  Injection Technique:  Loss of resistance using air  Attempts:  1  Catheter Size:  18 G  Events: no blood with aspiration, no cerebrospinal fluid with aspiration, no paresthesia and negative aspiration test    Test Dose:  Lidocaine 1.5% w/ epi and negative    Assessment:   Catheter Secured:  Tegaderm and tape  Insertion:  Uncomplicated  Patient tolerance:  Patient tolerated the procedure well with no immediate complications  Minimal relief with bolus. MAXIM removed tip intact. Replaced. Patient screamed in pain just with my touching her back.

## 2018-06-11 NOTE — PROGRESS NOTES
0700 verbal and bedside report received from Charlie Sellers RN. Patient care assumed at this time. 26 RN at bedside for morning assessment. No complaints from patient. 3753 Patient has been pushing epidural bolus button frequently but states its manageable with bolus button- declines replacing epidural. Changing epidural bag again. 9903 Dr. Josh Mckeon at bedside for SVE and AROM. 0815 IUPC and FSE placed by Dr. Josh Mckeon. Patient stating her pain is now increased since AROM and would like a redose. 4947 FSE came off fetal scalp. FSE replaced by Dr. Josh Mckeon. Paged Dr. Mullen Angry for Coy Miriam Mullen Angry at bedside for epidural redose. To increase rate of epidural to 14/hr.     1415 Dr. Mullen Angry at bedside to replace epidural.     6711 Patient states she feels completely better after epidural placement. 1002 Patient sleeping. Family at bedside. [address] Dr. Josh Mckeon to start pitocin due to decreased MVU's. Awaiting orders. 1110 Received pitocin order from Dr. Josh Mckeon. 1116 Started pitocin at this time. 1208 No complaints. Doing well. Family at bedside. [address] noted drastic fetal deceleration at this time. Interventions performed. Patient states she was having back pain prior to the deceleration. Placed in trendelenberg. Dr. Josh Mckeon requested at bedside. 1235 SVE performed by RN, Dr. Josh Mckeon at bedside. 1238 FHR returned to baseline. MD ordered to restart pitocin in 30 minutes. Placed in left lateral with peanut ball. To recheck in approximately one hour. 1349 SVE performed by RN. Complete. Will start pushing. 1  Dr. Josh Mckeon at bedside assessing progress of pushing. Still have pushing to do. MD will return when paged. 1450 VERNA Diaz RN at bedside to assist with pushing. 1535 Per Dr. Josh Mckeon, due to fourth degree tear, replace indwelling miguel catheter. [id number] Discussed with patient the importance of not getting out of bed without assistance from RN.  Encouraged patient not to get up for a while due to tear and allow her bottom to get a break. Patient states understanding. Call bell within reach. 1650 Patient eating a snack. States she is beginning to get uncomfortable but overall doing well. 1800 Epidural removed. Medications given. Bushra care performed and pads changed. Mesh underwear given. 1830 TRANSFER - OUT REPORT:    Verbal report given to Jade Bearden RN (name) on Guillermo Underwood  being transferred to MIU (unit) for routine progression of care       Report consisted of patients Situation, Background, Assessment and   Recommendations(SBAR). Information from the following report(s) SBAR, Kardex, Intake/Output, MAR, Accordion, Recent Results and Med Rec Status was reviewed with the receiving nurse. Lines:   Peripheral IV 06/10/18 Left Forearm (Active)   Site Assessment Clean, dry, & intact 6/11/2018  7:10 AM   Phlebitis Assessment 0 6/11/2018  7:10 AM   Infiltration Assessment 0 6/11/2018  7:10 AM   Dressing Status Clean, dry, & intact 6/11/2018  7:10 AM   Dressing Type Tape;Transparent 6/11/2018  7:10 AM   Hub Color/Line Status Pink; Infusing; Outdated 6/11/2018  7:10 AM        Opportunity for questions and clarification was provided. Patient transported with:   Registered Nurse     Baby bands verified with RN and patient.

## 2018-06-11 NOTE — ANESTHESIA PREPROCEDURE EVALUATION
Anesthetic History   No history of anesthetic complications            Review of Systems / Medical History  Patient summary reviewed, nursing notes reviewed and pertinent labs reviewed    Pulmonary  Within defined limits                 Neuro/Psych   Within defined limits           Cardiovascular  Within defined limits                Exercise tolerance: >4 METS     GI/Hepatic/Renal  Within defined limits              Endo/Other  Within defined limits           Other Findings   Comments: HSV           Physical Exam    Airway  Mallampati: II    Neck ROM: normal range of motion   Mouth opening: Normal     Cardiovascular  Regular rate and rhythm,  S1 and S2 normal,  no murmur, click, rub, or gallop  Rhythm: regular  Rate: normal         Dental  No notable dental hx       Pulmonary  Breath sounds clear to auscultation               Abdominal  GI exam deferred       Other Findings            Anesthetic Plan    ASA: 2  Anesthesia type: epidural      Post-op pain plan if not by surgeon: indwelling epidural catheter    Induction: Intravenous  Anesthetic plan and risks discussed with: Patient      Late entry - preop done, questions answered, and consent obtained prior to procedure; note entered after procedure at 12:50 AM

## 2018-06-12 PROCEDURE — 74011250637 HC RX REV CODE- 250/637: Performed by: OBSTETRICS & GYNECOLOGY

## 2018-06-12 PROCEDURE — 74011000250 HC RX REV CODE- 250

## 2018-06-12 PROCEDURE — 77030021125

## 2018-06-12 PROCEDURE — 65270000029 HC RM PRIVATE

## 2018-06-12 RX ORDER — METRONIDAZOLE 250 MG/1
500 TABLET ORAL EVERY 12 HOURS
Status: DISCONTINUED | OUTPATIENT
Start: 2018-06-12 | End: 2018-06-13 | Stop reason: HOSPADM

## 2018-06-12 RX ADMIN — IBUPROFEN 800 MG: 800 TABLET ORAL at 09:44

## 2018-06-12 RX ADMIN — OXYCODONE HYDROCHLORIDE AND ACETAMINOPHEN 1 TABLET: 5; 325 TABLET ORAL at 15:29

## 2018-06-12 RX ADMIN — OXYCODONE HYDROCHLORIDE AND ACETAMINOPHEN 1 TABLET: 5; 325 TABLET ORAL at 19:39

## 2018-06-12 RX ADMIN — Medication 1 TABLET: at 09:44

## 2018-06-12 RX ADMIN — DOCUSATE SODIUM 100 MG: 100 CAPSULE, LIQUID FILLED ORAL at 18:25

## 2018-06-12 RX ADMIN — LACTULOSE 20 G: 10 SOLUTION ORAL at 22:50

## 2018-06-12 RX ADMIN — IBUPROFEN 800 MG: 800 TABLET ORAL at 02:00

## 2018-06-12 RX ADMIN — DOCUSATE SODIUM 100 MG: 100 CAPSULE, LIQUID FILLED ORAL at 09:44

## 2018-06-12 RX ADMIN — OXYCODONE HYDROCHLORIDE AND ACETAMINOPHEN 1 TABLET: 5; 325 TABLET ORAL at 22:50

## 2018-06-12 RX ADMIN — IBUPROFEN 800 MG: 800 TABLET ORAL at 18:25

## 2018-06-12 RX ADMIN — OXYCODONE HYDROCHLORIDE AND ACETAMINOPHEN 1 TABLET: 5; 325 TABLET ORAL at 02:00

## 2018-06-12 RX ADMIN — OXYCODONE HYDROCHLORIDE AND ACETAMINOPHEN 1 TABLET: 5; 325 TABLET ORAL at 07:14

## 2018-06-12 RX ADMIN — METRONIDAZOLE 500 MG: 250 TABLET ORAL at 22:35

## 2018-06-12 RX ADMIN — METRONIDAZOLE 500 MG: 250 TABLET ORAL at 10:52

## 2018-06-12 NOTE — PROGRESS NOTES
Bedside and Verbal shift change report given to Kayla Goel RN (oncoming nurse) by Malka Wills RNC (offgoing nurse). Report included the following information SBAR, Kardex, Intake/Output, MAR and Recent Results.

## 2018-06-12 NOTE — PROGRESS NOTES
Post-Partum Day Number 1 Progress Note    Damian Burr       Information for the patient's :  Beth Garcia, Male [[id number]]   Vaginal, Spontaneous Delivery   Patient doing well without significant complaint. Voiding without difficulty, normal lochia. Vitals:  Visit Vitals    /75 (BP 1 Location: Right arm, BP Patient Position: At rest)    Pulse 91    Temp 98.4 °F (36.9 °C)    Resp 16    LMP 2017 (Exact Date)    SpO2 100%    Breastfeeding No     Temp (24hrs), Av °F (37.2 °C), Min:98.3 °F (36.8 °C), Max:100.1 °F (37.8 °C)        Exam:   Patient without distress. Abdomen soft, fundus firm, nontender                Perineum with normal lochia noted. Lower extremities are negative for swelling, cords or tenderness.     Labs:     Lab Results   Component Value Date/Time    WBC 10.5 06/10/2018 11:31 PM    WBC 10.3 2018 09:48 AM    WBC 8.6 10/17/2017 02:14 PM    WBC 8.4 2014 11:45 AM    HGB 11.5 06/10/2018 11:31 PM    HGB 11.3 2018 09:48 AM    HGB 12.5 10/17/2017 02:14 PM    HGB 12.8 2014 11:45 AM    HCT 35.1 06/10/2018 11:31 PM    HCT 34.8 2018 09:48 AM    HCT 38.5 10/17/2017 02:14 PM    HCT 36.9 2014 11:45 AM    PLATELET 051  11:31 PM    PLATELET 214  09:48 AM    PLATELET 918  02:14 PM    PLATELET 873  11:45 AM    Hgb, External 12.5 10/17/2017    Hct, External 38.5 10/17/2017    Platelet cnt., External 302 10/17/2017       Assessment: Doing well, post partum day 1, s/p  with 4th degree tear  - continue lactulose and start flagyl 500 bid x 1 week  - encouraged no straining  -continue routine pp and perineal care as well as maternal education

## 2018-06-12 NOTE — LACTATION NOTE
This note was copied from a baby's chart. Mother resting in bed. Baby spitty at times. Dark brown and beige emesis noted on washcloth. Explained to parents that baby may have swallowed mothers blood at delivery and amniotic fluid. Mother states they have also given formula when she has been to tired to nurse or baby was sleepy and did not latch. Hand expression showed to mother, drops easily expressed. BF basics reviewed. Assisted mother with positioning and latching baby to breast.  Nipples noted to be flat, baby drowsy and having difficulty latching. Shield applied, baby latched with rhythmic sucking on and off. Reassured mother that things will come together in time. Mother taking Flagyl, printed info given. Discussed with mother her plan for feeding. Reviewed the benefits of exclusive breast milk feeding during the hospital stay. Informed her of the risks of using formula to supplement in the first few days of life as well as the benefits of successful breast milk feeding; referred her to the Breastfeeding booklet about this information. She acknowledges understanding of information reviewed and states that it is her plan to breast and formula feed her infant. Will support her choice and offer additional information as needed. Reviewed breastfeeding basics:  How milk is made and normal  breastfeeding behaviors discussed. Supply and demand,  stomach size, early feeding cues, skin to skin bonding with comfortable positioning and baby led latch-on reviewed. How to identify signs of successful breastfeeding sessions reviewed; education on assymetrical latch, signs of effective latching vs shallow, in-effective latching, normal  feeding frequency and duration and expected infant output discussed. urs of discharge, at 2 weeks of life and normalcy of requesting pediatric weight checks as needed in between visits.     Pt chooses to do both breast and bottle feeding, will follow recommendations to breastfeed first to help establish milk supply and only top off with formula, if needed, will be educated on potential consequences of early supplementation on breastfeeding success, but will be supported in decision to do both. Hand Expression Education:  Mom taught how to manually hand express her colostrum. Emphasized the importance of providing infant with valuable colostrum as infant rests skin to skin at breast.  Aware to avoid extended periods of non-feeding. Aware to offer 10-20+ drops of colostrum every 2-3 hours until infant is latching and nursing effectively. Taught the rationale behind this low tech but highly effective evidence based practice. Pt will successfully establish breastfeeding by feeding in response to early feeding cues   or wake every 3h, will obtain deep latch, and will keep log of feedings/output. Taught to BF at hunger cues and or q 2-3 hrs and to offer 10-20 drops of hand expressed colostrum at any non-feeds.       Breast Assessment  Left Breast: Large, Extra large  Left Nipple: Flat, Intact  Right Breast: Large, Extra large  Right Nipple: Flat, Intact  Breast- Feeding Assessment  Attends Breast-Feeding Classes: No  Breast-Feeding Experience: No  Breast Trauma/Surgery: No  Type/Quality: Fair  Lactation Consultant Visits  Breast-Feedings: Good   Mother/Infant Observation  Mother Observation: Breast comfortable, Close hold, Holds breast  Infant Observation: Lips flanged, lower, Lips flanged, upper, Opens mouth, Rhythmic suck  LATCH Documentation  Latch: Grasps breast, tongue down, lips flanged, rhythmic sucking  Audible Swallowing: A few with stimulation  Type of Nipple: Flat (using shield)  Comfort (Breast/Nipple): Soft/non-tender  Hold (Positioning): No assist from staff, mother able to position/hold infant  LATCH Score: 8

## 2018-06-12 NOTE — PROGRESS NOTES
Bedside and Verbal shift change report given to Kortney Ghosh RNC (oncoming nurse) by Dilshad Merino RN (offgoing nurse). Report included the following information SBAR, Kardex and MAR.

## 2018-06-13 VITALS
DIASTOLIC BLOOD PRESSURE: 58 MMHG | HEART RATE: 78 BPM | TEMPERATURE: 98 F | SYSTOLIC BLOOD PRESSURE: 118 MMHG | RESPIRATION RATE: 16 BRPM | OXYGEN SATURATION: 100 %

## 2018-06-13 PROCEDURE — 74011250637 HC RX REV CODE- 250/637: Performed by: OBSTETRICS & GYNECOLOGY

## 2018-06-13 PROCEDURE — 77030021125

## 2018-06-13 RX ORDER — IBUPROFEN 800 MG/1
800 TABLET ORAL EVERY 8 HOURS
Qty: 60 TAB | Refills: 1 | Status: SHIPPED | OUTPATIENT
Start: 2018-06-13

## 2018-06-13 RX ORDER — METRONIDAZOLE 500 MG/1
500 TABLET ORAL EVERY 12 HOURS
Qty: 14 TAB | Refills: 0 | Status: SHIPPED | OUTPATIENT
Start: 2018-06-13 | End: 2018-06-13

## 2018-06-13 RX ORDER — METRONIDAZOLE 500 MG/1
500 TABLET ORAL EVERY 12 HOURS
Qty: 14 TAB | Refills: 1 | Status: SHIPPED | OUTPATIENT
Start: 2018-06-13

## 2018-06-13 RX ORDER — OXYCODONE AND ACETAMINOPHEN 5; 325 MG/1; MG/1
1 TABLET ORAL
Qty: 30 TAB | Refills: 0 | Status: SHIPPED | OUTPATIENT
Start: 2018-06-13

## 2018-06-13 RX ORDER — DOCUSATE SODIUM 100 MG/1
100 CAPSULE, LIQUID FILLED ORAL 2 TIMES DAILY
Qty: 60 CAP | Refills: 2 | Status: SHIPPED | OUTPATIENT
Start: 2018-06-13 | End: 2018-06-13

## 2018-06-13 RX ORDER — IBUPROFEN 800 MG/1
800 TABLET ORAL EVERY 8 HOURS
Qty: 60 TAB | Refills: 0 | Status: SHIPPED | OUTPATIENT
Start: 2018-06-13 | End: 2018-06-13

## 2018-06-13 RX ORDER — DOCUSATE SODIUM 100 MG/1
100 CAPSULE, LIQUID FILLED ORAL 2 TIMES DAILY
Qty: 60 CAP | Refills: 1 | Status: SHIPPED | OUTPATIENT
Start: 2018-06-13 | End: 2018-09-11

## 2018-06-13 RX ADMIN — OXYCODONE HYDROCHLORIDE AND ACETAMINOPHEN 1 TABLET: 5; 325 TABLET ORAL at 04:06

## 2018-06-13 RX ADMIN — DOCUSATE SODIUM 100 MG: 100 CAPSULE, LIQUID FILLED ORAL at 08:46

## 2018-06-13 RX ADMIN — Medication 1 TABLET: at 08:46

## 2018-06-13 RX ADMIN — METRONIDAZOLE 500 MG: 250 TABLET ORAL at 08:46

## 2018-06-13 RX ADMIN — IBUPROFEN 800 MG: 800 TABLET ORAL at 04:06

## 2018-06-13 NOTE — ROUTINE PROCESS
Discharge paperwork signed in computer. 2 prescriptions given to patient. Vitals within normal limits.

## 2018-06-13 NOTE — DISCHARGE SUMMARY
Obstetrical Discharge Summary     Name: Demetrice Goins MRN: [id number]  SSN: [id number]    YOB: 1995  Age: 23 y.o. Sex: female      Admit Date: 6/10/2018    Discharge Date: 2018     Admitting Physician: Zackary Horn MD     Attending Physician:  Zackary Horn MD     * Admission Diagnoses: Pregnancy    * Discharge Diagnoses:   Information for the patient's :  Jericho Gordon, Male [[id number]]   Delivery of a 8 lb 7.1 oz (3.83 kg) male infant via Vaginal, Spontaneous Delivery on 2018 at 3:17 PM  by . Apgars were 9 and 9.        Additional Diagnoses:   Hospital Problems as of 2018  Date Reviewed: 2018          Codes Class Noted - Resolved POA    Pregnancy ICD-10-CM: Z34.90  ICD-9-CM: V22.2  6/10/2018 - Present Unknown             Lab Results   Component Value Date/Time    ABO/Rh(D) A POSITIVE 2014 11:45 AM    Rubella, External Immune 10/17/2017    GrBStrep, External Negative 2018    ABO,Rh A Positive 10/17/2017      Immunization History   Administered Date(s) Administered    HPV (9-valent) 2015, 10/19/2015, 2016    Tdap 2018       * Procedures:  with 4th degree laceration  * No surgery found *      [place]  Depression Scale  I have been able to laugh and see the funny side of things: Not quite so much now  I have looked forward with enjoyment to things: As much as I ever did  I have blamed myself unnecessarily when things went wrong: Not very often  I have been anxious or worried for no good reason: Yes, sometimes  I have felt scared or panicky for no very good reason: No, not at all  Things have been getting on top of me: No, most of the time I have coped quite well  I have been so unhappy that I have had difficulty sleeping: Not very often  I have felt sad or miserable: Not very often  I have been so unhappy that I have been crying: No, never  The thought of harming myself has occurred to me: Never  Total Score: 7    * Discharge Condition: stable    * Hospital Course: Normal hospital course following the delivery. * Disposition: home with office follow-up    Discharge Medications:   Current Discharge Medication List      START taking these medications    Details   lactulose (CHRONULAC) 10 gram/15 mL solution Take 30 mL by mouth nightly. Qty: 1 Bottle, Refills: 2      metroNIDAZOLE (FLAGYL) 500 mg tablet Take 1 Tab by mouth every twelve (12) hours. Qty: 14 Tab, Refills: 0      oxyCODONE-acetaminophen (PERCOCET) 5-325 mg per tablet Take 1 Tab by mouth every four (4) hours as needed. Max Daily Amount: 6 Tabs. Qty: 30 Tab, Refills: 0    Associated Diagnoses: Postpartum pain      ibuprofen (MOTRIN) 800 mg tablet Take 1 Tab by mouth every eight (8) hours. Qty: 60 Tab, Refills: 0    Associated Diagnoses: Postpartum pain      docusate sodium (COLACE) 100 mg capsule Take 1 Cap by mouth two (2) times a day for 90 days. Qty: 60 Cap, Refills: 2             * Follow-up Care/Patient Instructions:   Activity: activity as tolerated  Diet: general  Wound Care: as directed    Follow-up Information     Follow up With Details Comments [address] Francisco J MD In 6 weeks  [address] [place] [id number]  [phone]             Signed By:  Francisco J Garcia MD     June 13, 2018

## 2018-06-13 NOTE — PROGRESS NOTES
Post-Partum Day Number 2 Progress Note    Kevin Single     Information for the patient's :  Nayan Carter, Male [[id number]]   Vaginal, Spontaneous Delivery   Patient doing well without significant complaint. Voiding without difficulty, normal lochia. Vitals:  Visit Vitals    /54 (BP 1 Location: Left arm, BP Patient Position: At rest)    Pulse 81    Temp 98.3 °F (36.8 °C)    Resp 16    LMP 2017 (Exact Date)    SpO2 100%    Breastfeeding Unknown     Temp (24hrs), Av.3 °F (36.8 °C), Min:98.2 °F (36.8 °C), Max:98.3 °F (36.8 °C)      Exam:         Patient without distress. Abdomen soft, fundus firm, nontender                 ower extremities are negative for swelling, cords or tenderness. Labs:     Lab Results   Component Value Date/Time    WBC 10.5 06/10/2018 11:31 PM    WBC 10.3 2018 09:48 AM    WBC 8.6 10/17/2017 02:14 PM    WBC 8.4 2014 11:45 AM    HGB 11.5 06/10/2018 11:31 PM    HGB 11.3 2018 09:48 AM    HGB 12.5 10/17/2017 02:14 PM    HGB 12.8 2014 11:45 AM    HCT 35.1 06/10/2018 11:31 PM    HCT 34.8 2018 09:48 AM    HCT 38.5 10/17/2017 02:14 PM    HCT 36.9 2014 11:45 AM    PLATELET 763  11:31 PM    PLATELET 452  09:48 AM    PLATELET 337  02:14 PM    PLATELET 695  11:45 AM    Hgb, External 12.5 10/17/2017    Hct, External 38.5 10/17/2017    Platelet cnt., External 302 10/17/2017       No results found for this or any previous visit (from the past 24 hour(s)). Assessment: Doing well, post partum day 2    Plan:   1. Discharge home today  2. Follow up in office in 6 weeks with Antoinette Parker MD  3. Post partum activity advised, diet as tolerated  4.  Discharge Medications: ibuprofen, percocet and medications prior to admission

## 2018-06-13 NOTE — ROUTINE PROCESS
Bedside and Verbal shift change report given to Sandie Grace RN (oncoming nurse) by Nancye Primrose, RN (offgoing nurse). Report included the following information SBAR, Kardex and MAR.

## 2018-06-13 NOTE — DISCHARGE INSTRUCTIONS
Patient Discharge Instructions    Estefania Dunham / [id number] : 1995    Admitted 6/10/2018 Discharged: 2018       Personal Items    Please collect from your nurse all clothing which belongs to you. Please collect from the  any valuables you stored during your stay, and please remember all of your personal items, such as dentures, canes, and eyeglasses. Activity Instructions    You must avoid lifting more than 10 pounds until your physician instructs you differently. You should avoid driving, climbing stairs, sexual activity, tub baths. You may resume driving, climbing stairs, sexual activity and tub baths. I understand that if any problems occur once I am at home I am to contact my physician. I understand and acknowledge receipt of the instructions indicated above.

## 2018-06-13 NOTE — ROUTINE PROCESS
Discharge instructions given to patient including but not limited to signs and symptoms and who to call; restrictions and limitations; postpartum depression. All questions answered.

## 2018-06-30 RX ORDER — NORETHINDRONE ACETATE AND ETHINYL ESTRADIOL 1.5-30(21)
KIT ORAL
Qty: 84 TAB | Refills: 3 | Status: SHIPPED | OUTPATIENT
Start: 2018-06-30 | End: 2019-07-02 | Stop reason: SDUPTHER

## 2018-07-30 ENCOUNTER — ROUTINE PRENATAL (OUTPATIENT)
Dept: OBGYN CLINIC | Age: 23
End: 2018-07-30

## 2018-07-30 VITALS
BODY MASS INDEX: 49.08 KG/M2 | SYSTOLIC BLOOD PRESSURE: 116 MMHG | DIASTOLIC BLOOD PRESSURE: 64 MMHG | HEIGHT: 63 IN | WEIGHT: 277 LBS

## 2018-07-30 NOTE — PATIENT INSTRUCTIONS
Breast Self-Exam: Care Instructions  Your Care Instructions    A breast self-exam is when you check your breasts for lumps or changes. This regular exam helps you learn how your breasts normally look and feel. Most breast problems or changes are not because of cancer. Breast self-exam is not a substitute for a mammogram. Having regular breast exams by your doctor and regular mammograms improve your chances of finding any problems with your breasts. Some women set a time each month to do a step-by-step breast self-exam. Other women like a less formal system. They might look at their breasts as they brush their teeth, or feel their breasts once in a while in the shower. If you notice a change in your breast, tell your doctor. Follow-up care is a key part of your treatment and safety. Be sure to make and go to all appointments, and call your doctor if you are having problems. It's also a good idea to know your test results and keep a list of the medicines you take. How do you do a breast self-exam?  · The best time to examine your breasts is usually one week after your menstrual period begins. Your breasts should not be tender then. If you do not have periods, you might do your exam on a day of the month that is easy to remember. · To examine your breasts:  ¨ Remove all your clothes above the waist and lie down. When you are lying down, your breast tissue spreads evenly over your chest wall, which makes it easier to feel all your breast tissue. ¨ Use the pads-not the fingertips-of the 3 middle fingers of your left hand to check your right breast. Move your fingers slowly in small coin-sized circles that overlap. ¨ Use three levels of pressure to feel of all your breast tissue. Use light pressure to feel the tissue close to the skin surface. Use medium pressure to feel a little deeper. Use firm pressure to feel your tissue close to your breastbone and ribs.  Use each pressure level to feel your breast tissue before moving on to the next spot. ¨ Check your entire breast, moving up and down as if following a strip from the collarbone to the bra line, and from the armpit to the ribs. Repeat until you have covered the entire breast.  ¨ Repeat this procedure for your left breast, using the pads of the 3 middle fingers of your right hand. · To examine your breasts while in the shower:  ¨ Place one arm over your head and lightly soap your breast on that side. ¨ Using the pads of your fingers, gently move your hand over your breast (in the strip pattern described above), feeling carefully for any lumps or changes. ¨ Repeat for the other breast.  · Have your doctor inspect anything you notice to see if you need further testing. Where can you learn more? Go to http://nghia-keshawn.info/. Enter P148 in the search box to learn more about \"Breast Self-Exam: Care Instructions. \"  Current as of: May 12, 2017  Content Version: 11.7  © 1941-1313 [employer]. Care instructions adapted under license by [employer] (which disclaims liability or warranty for this information). If you have questions about a medical condition or this instruction, always ask your healthcare professional. [place] [address] any warranty or liability for your use of this information.

## 2018-07-30 NOTE — PROGRESS NOTES
Postpartum evaluation    Kristina Murphy is a 20 y.o. female who presents for a postpartum exam.     She is now six weeks post normal spontaneous vaginal delivery. She had a 4th degree laceration and labial laceration. She reports no leakage of gas or stool. Her baby is doing well. She has had no menses since delivery. She has had the following significant problems since her delivery: none    The patient is bottle feeding without difficulty. She is currently taking: no medications. She is due for her next AE in 3 months.      Visit Vitals    /64    Ht 5' 3\" (1.6 m)    Wt 277 lb (125.6 kg)    BMI 49.07 kg/m2       PHYSICAL EXAMINATION    Constitutional  · Appearance: well-nourished, well developed, alert, in no acute distress    HENT  · Head and Face: appears normal    Neck  · Inspection/Palpation: normal appearance, no masses or tenderness  · Lymph Nodes: no lymphadenopathy present  · Thyroid: gland size normal, nontender, no nodules or masses present on palpation    Gastrointestinal  · Abdominal Examination: abdomen non-tender to palpation, normal bowel sounds, no masses present  · Liver and spleen: no hepatomegaly present, spleen not palpable  · Hernias: no hernias identified    Genitourinary  · External Genitalia: normal appearance for age, no discharge present, no tenderness present, no inflammatory lesions present, no masses present, no atrophy present  · Vagina: normal vaginal vault without central or paravaginal defects, no discharge present, no inflammatory lesions present, no masses present  · Bladder: non-tender to palpation  · Urethra: appears normal  · Cervix: normal   · Uterus: normal size, shape and consistency  · Adnexa: no adnexal tenderness present, no adnexal masses present  · Perineum: healing well  · Anus: anus within normal limits, no hemorrhoids present  · Inguinal Lymph Nodes: no lymphadenopathy present    Skin  · General Inspection: no rash, no lesions identified    Neurologic/Psychiatric  · Mental Status:  · Orientation: grossly oriented to person, place and time  · Mood and Affect: mood normal, affect appropriate    Assessment:  Normal postpartum check, s/p  with 4th degree laceration and labial laceration. Healing well. Offered referral to colorectal, as pt has no complaints she declined referral to colorectal.    Plan:  RTO for AE.

## 2019-07-02 RX ORDER — NORETHINDRONE ACETATE AND ETHINYL ESTRADIOL 1.5-30(21)
KIT ORAL
Qty: 28 TAB | Refills: 1 | Status: SHIPPED | OUTPATIENT
Start: 2019-07-02 | End: 2019-09-11 | Stop reason: SDUPTHER

## 2019-07-02 NOTE — TELEPHONE ENCOUNTER
30year old patient last seen in the office on 7/30/18 for post partum exam    Patient has AE on 8/5/19.     Prescription refill sent as per MD order to get patient to her scheduled appointment to patient preferred pharmacy

## 2019-09-23 ENCOUNTER — OFFICE VISIT (OUTPATIENT)
Dept: OBGYN CLINIC | Age: 24
End: 2019-09-23

## 2019-09-23 DIAGNOSIS — Z01.419 WELL FEMALE EXAM WITH ROUTINE GYNECOLOGICAL EXAM: Primary | ICD-10-CM

## 2019-09-23 DIAGNOSIS — Z11.3 SCREENING EXAMINATION FOR SEXUALLY TRANSMITTED DISEASE: ICD-10-CM

## 2019-09-23 PROBLEM — Z34.00 SUPERVISION OF NORMAL FIRST PREGNANCY: Status: RESOLVED | Noted: 2017-10-17 | Resolved: 2019-09-23

## 2019-09-23 RX ORDER — NORETHINDRONE ACETATE AND ETHINYL ESTRADIOL 1.5-30(21)
1 KIT ORAL DAILY
Qty: 3 PACKAGE | Refills: 4 | Status: SHIPPED | OUTPATIENT
Start: 2019-09-23 | End: 2020-10-28

## 2019-09-23 NOTE — PROGRESS NOTES
Annual exam ages 21-44    Ailyn Covarrubias is a ,  30 y.o. female   No LMP recorded. She presents for her annual checkup. She is having no significant problems. She wants std testing todau. With regard to the Gardasil vaccine, she reports that she has received all 3 injections      Menstrual status:    Her periods are light in flow. She is using one to three pads or tampons per day, very light to non existent due to contraceptive hormones. She does not have dysmenorrhea. She reports no premenstrual symptoms. Contraception:    The current method of family planning is OCP. Sexual history:    She  reports that she currently engages in sexual activity and has had partner(s) who are Male. She reports using the following method of birth control/protection: Pill. Medical conditions:    Since her last annual GYN exam about one year ago, she has not the following changes in her health history: none. Surgical history confirmed with patient. has a past surgical history that includes hx other surgical and hx dilation and curettage. Pap and Mammogram History:    Her most recent Pap smear was normal, obtained 2.5 year(s) ago. The patient has never had a mammogram.    Breast Cancer History/Substance Abuse: negative    Past Medical History:   Diagnosis Date    Abnormal Pap smear of cervix     14 ASCUS HPV POS  7/1/15 ASCUS HPV POS    Depression     Genital herpes     Pap smear for cervical cancer screening 2017 ASCUS HPV POS  7/1/15 ASCUS HPV POS, 17 neg     Past Surgical History:   Procedure Laterality Date    HX DILATION AND CURETTAGE      HX OTHER SURGICAL      colonoscopy       Current Outpatient Medications   Medication Sig Dispense Refill    norethindrone-ethinyl estradiol-iron (BLISOVI FE ., ,) 1.5 mg-30 mcg (21)/75 mg (7) tab Take 1 Tab by mouth daily. 1 Package 0    lactulose (CHRONULAC) 10 gram/15 mL solution Take 30 mL by mouth nightly.  1 Bottle 2    oxyCODONE-acetaminophen (PERCOCET) 5-325 mg per tablet Take 1 Tab by mouth every four (4) hours as needed. Max Daily Amount: 6 Tabs. 30 Tab 0    ibuprofen (MOTRIN) 800 mg tablet Take 1 Tab by mouth every eight (8) hours. 60 Tab 1    metroNIDAZOLE (FLAGYL) 500 mg tablet Take 1 Tab by mouth every twelve (12) hours. 14 Tab 1    valACYclovir (VALTREX) 1 gram tablet Take 1 Tab by mouth daily. 30 Tab 2    ondansetron hcl (ZOFRAN) 8 mg tablet Take 1 Tab by mouth every eight (8) hours as needed for Nausea. 60 Tab 5    doxylamine-pyridoxine, vit B6, (DICLEGIS) 10-10 mg TbEC DR tablet Take 2 Tabs by mouth nightly. 100 Tab 2    OTHER Birth Control medication. Unable to remember name       Allergies: Patient has no known allergies. Tobacco History:  reports that she has never smoked. She has never used smokeless tobacco.  Alcohol Abuse:  reports that she drinks alcohol. Drug Abuse:  reports that she does not use drugs. Family Medical/Cancer History:   Family History   Problem Relation Age of Onset    No Known Problems Mother         Review of Systems - History obtained from the patient  Constitutional: negative for weight loss, fever, night sweats  HEENT: negative for hearing loss, earache, congestion, snoring, sorethroat  CV: negative for chest pain, palpitations, edema  Resp: negative for cough, shortness of breath, wheezing  GI: negative for change in bowel habits, abdominal pain, black or bloody stools  : negative for frequency, dysuria, hematuria, vaginal discharge  MSK: negative for back pain, joint pain, muscle pain  Breast: negative for breast lumps, nipple discharge, galactorrhea  Skin :negative for itching, rash, hives  Neuro: negative for dizziness, headache, confusion, weakness  Psych: negative for anxiety, depression, change in mood  Heme/lymph: negative for bleeding, bruising, pallor    Physical Exam    There were no vitals taken for this visit.     Constitutional  · Appearance: well-nourished, well developed, alert, in no acute distress    HENT  · Head and Face: appears normal    Neck  · Inspection/Palpation: normal appearance, no masses or tenderness  · Lymph Nodes: no lymphadenopathy present  · Thyroid: gland size normal, nontender, no nodules or masses present on palpation    Chest  · Respiratory Effort: breathing unlabored    Breasts  · Inspection of Breasts: breasts symmetrical, no skin changes, no discharge present, nipple appearance normal, no skin retraction present  · Palpation of Breasts and Axillae: no masses present on palpation, no breast tenderness  · Axillary Lymph Nodes: no lymphadenopathy present    Gastrointestinal  · Abdominal Examination: abdomen non-tender to palpation, normal bowel sounds, no masses present  · Liver and spleen: no hepatomegaly present, spleen not palpable  · Hernias: no hernias identified    Genitourinary  · External Genitalia: normal appearance for age, no discharge present, no tenderness present, no inflammatory lesions present, no masses present, no atrophy present  · Vagina: normal vaginal vault without central or paravaginal defects, no discharge present, no inflammatory lesions present, no masses present  · Bladder: non-tender to palpation  · Urethra: appears normal  · Cervix: normal   · Uterus: normal size, shape and consistency  · Adnexa: no adnexal tenderness present, no adnexal masses present  · Perineum: perineum within normal limits, no evidence of trauma, no rashes or skin lesions present  · Anus: anus within normal limits, no hemorrhoids present  · Inguinal Lymph Nodes: no lymphadenopathy present    Skin  · General Inspection: no rash, no lesions identified    Neurologic/Psychiatric  · Mental Status:  · Orientation: grossly oriented to person, place and time  · Mood and Affect: mood normal, affect appropriate    .   Assessment:  Routine gynecologic examination  Her current medical status is satisfactory with no evidence of significant gynecologic issues.     Plan:  Counseled re: diet, exercise, healthy lifestyle  Return for yearly wellness visits  Pt counseled regarding co-testing for high risk HPV with pap  Rec screening mammo at either 33 or 38

## 2019-09-26 LAB
C TRACH RRNA CVX QL NAA+PROBE: NEGATIVE
CYTOLOGIST CVX/VAG CYTO: NORMAL
CYTOLOGY CVX/VAG DOC CYTO: NORMAL
CYTOLOGY CVX/VAG DOC THIN PREP: NORMAL
CYTOLOGY HISTORY:: NORMAL
DX ICD CODE: NORMAL
LABCORP, 190119: NORMAL
Lab: NORMAL
N GONORRHOEA RRNA CVX QL NAA+PROBE: NEGATIVE
OTHER STN SPEC: NORMAL
STAT OF ADQ CVX/VAG CYTO-IMP: NORMAL
T VAGINALIS RRNA SPEC QL NAA+PROBE: NEGATIVE

## 2020-10-28 ENCOUNTER — TELEPHONE (OUTPATIENT)
Dept: OBGYN CLINIC | Age: 25
End: 2020-10-28

## 2020-10-28 RX ORDER — NORETHINDRONE ACETATE AND ETHINYL ESTRADIOL 1.5-30(21)
1 KIT ORAL DAILY
Qty: 1 PACKAGE | Refills: 0 | Status: SHIPPED | OUTPATIENT
Start: 2020-10-28 | End: 2020-11-13 | Stop reason: SDUPTHER

## 2020-10-28 NOTE — TELEPHONE ENCOUNTER
Call received at 2:25PM      30year old patient last seen in the office on 9/23/2019 and has next appointment on 11/13/2020    Patient calling about getting a refill on her ocp to get her to her next appointment. Prescription sent as per MD order to get patient her her scheduled appointment    Patient advised to keep appointment in order to get further refills. Patient verbalized understanding.

## 2020-11-13 ENCOUNTER — OFFICE VISIT (OUTPATIENT)
Dept: OBGYN CLINIC | Age: 25
End: 2020-11-13
Payer: COMMERCIAL

## 2020-11-13 VITALS — DIASTOLIC BLOOD PRESSURE: 62 MMHG | BODY MASS INDEX: 39.68 KG/M2 | SYSTOLIC BLOOD PRESSURE: 106 MMHG | WEIGHT: 224 LBS

## 2020-11-13 DIAGNOSIS — Z01.419 ENCOUNTER FOR GYNECOLOGICAL EXAMINATION WITHOUT ABNORMAL FINDING: Primary | ICD-10-CM

## 2020-11-13 DIAGNOSIS — Z11.3 SCREENING EXAMINATION FOR SEXUALLY TRANSMITTED DISEASE: ICD-10-CM

## 2020-11-13 PROCEDURE — 99395 PREV VISIT EST AGE 18-39: CPT | Performed by: OBSTETRICS & GYNECOLOGY

## 2020-11-13 RX ORDER — BUSPIRONE HYDROCHLORIDE 7.5 MG/1
TABLET ORAL
COMMUNITY
Start: 2020-08-24

## 2020-11-13 RX ORDER — DICLOFENAC SODIUM 75 MG/1
TABLET, DELAYED RELEASE ORAL
COMMUNITY
Start: 2020-10-06

## 2020-11-13 RX ORDER — FLUOXETINE HYDROCHLORIDE 40 MG/1
CAPSULE ORAL
COMMUNITY
Start: 2020-07-19

## 2020-11-13 RX ORDER — NORETHINDRONE ACETATE AND ETHINYL ESTRADIOL 1.5-30(21)
1 KIT ORAL DAILY
Qty: 3 PACKAGE | Refills: 4 | Status: SHIPPED | OUTPATIENT
Start: 2020-11-13 | End: 2021-12-02

## 2020-11-13 NOTE — PROGRESS NOTES
Annual exam ages 21-44    Neisha Veloz is a ,  30 y.o. female   No LMP recorded. She presents for her annual checkup. She is having no significant problems. She wants std testing today. Menstrual status:    Her periods are light in flow. She is using one to three pads or tampons per day, very light to non existent due to contraceptive hormones. She does not have dysmenorrhea. She reports no premenstrual symptoms. Contraception:    The current method of family planning is OCP. Sexual history:    She  reports being sexually active and has had partner(s) who are Male. She reports using the following method of birth control/protection: Pill. Medical conditions:    Since her last annual GYN exam about one year ago, she has not the following changes in her health history: none. Surgical history confirmed with patient. has a past surgical history that includes hx other surgical and hx dilation and curettage. Pap and Mammogram History:    Her most recent Pap smear was normal, obtained 1 year(s) ago. The patient has never had a mammogram.    Breast Cancer History/Substance Abuse: negative    Past Medical History:   Diagnosis Date    Abnormal Pap smear of cervix     14 ASCUS HPV POS  7/1/15 ASCUS HPV POS    Depression     Genital herpes     Pap smear for cervical cancer screening 2017 ASCUS HPV POS  7/1/15 ASCUS HPV POS, 17 neg     Past Surgical History:   Procedure Laterality Date    HX DILATION AND CURETTAGE      HX OTHER SURGICAL      colonoscopy       Current Outpatient Medications   Medication Sig Dispense Refill    norethindrone-ethinyl estradiol-iron (Blisovi Fe 1.5/, ,) 1.5 mg-30 mcg (21)/75 mg (7) tab Take 1 Tab by mouth daily. 1 Package 0    lactulose (CHRONULAC) 10 gram/15 mL solution Take 30 mL by mouth nightly.  1 Bottle 2    oxyCODONE-acetaminophen (PERCOCET) 5-325 mg per tablet Take 1 Tab by mouth every four (4) hours as needed. Max Daily Amount: 6 Tabs. 30 Tab 0    ibuprofen (MOTRIN) 800 mg tablet Take 1 Tab by mouth every eight (8) hours. 60 Tab 1    metroNIDAZOLE (FLAGYL) 500 mg tablet Take 1 Tab by mouth every twelve (12) hours. 14 Tab 1    valACYclovir (VALTREX) 1 gram tablet Take 1 Tab by mouth daily. 30 Tab 2    ondansetron hcl (ZOFRAN) 8 mg tablet Take 1 Tab by mouth every eight (8) hours as needed for Nausea. 60 Tab 5    doxylamine-pyridoxine, vit B6, (DICLEGIS) 10-10 mg TbEC DR tablet Take 2 Tabs by mouth nightly. 100 Tab 2    OTHER Birth Control medication. Unable to remember name       Allergies: Patient has no known allergies. Tobacco History:  reports that she has never smoked. She has never used smokeless tobacco.  Alcohol Abuse:  reports current alcohol use. Drug Abuse:  reports no history of drug use. Family Medical/Cancer History:   Family History   Problem Relation Age of Onset    No Known Problems Mother         Review of Systems - History obtained from the patient  Constitutional: negative for weight loss, fever, night sweats  HEENT: negative for hearing loss, earache, congestion, snoring, sorethroat  CV: negative for chest pain, palpitations, edema  Resp: negative for cough, shortness of breath, wheezing  GI: negative for change in bowel habits, abdominal pain, black or bloody stools  : negative for frequency, dysuria, hematuria, vaginal discharge  MSK: negative for back pain, joint pain, muscle pain  Breast: negative for breast lumps, nipple discharge, galactorrhea  Skin :negative for itching, rash, hives  Neuro: negative for dizziness, headache, confusion, weakness  Psych: negative for anxiety, depression, change in mood  Heme/lymph: negative for bleeding, bruising, pallor    Physical Exam    There were no vitals taken for this visit.     Constitutional  · Appearance: well-nourished, well developed, alert, in no acute distress    HENT  · Head and Face: appears normal    Neck  · Inspection/Palpation: normal appearance, no masses or tenderness  · Lymph Nodes: no lymphadenopathy present  · Thyroid: gland size normal, nontender, no nodules or masses present on palpation    Chest  · Respiratory Effort: breathing unlabored    Breasts  · Inspection of Breasts: breasts symmetrical, no skin changes, no discharge present, nipple appearance normal, no skin retraction present  · Palpation of Breasts and Axillae: no masses present on palpation, no breast tenderness  · Axillary Lymph Nodes: no lymphadenopathy present    Gastrointestinal  · Abdominal Examination: abdomen non-tender to palpation, normal bowel sounds, no masses present  · Liver and spleen: no hepatomegaly present, spleen not palpable  · Hernias: no hernias identified    Genitourinary  · External Genitalia: normal appearance for age, no discharge present, no tenderness present, no inflammatory lesions present, no masses present, no atrophy present  · Vagina: normal vaginal vault without central or paravaginal defects, no discharge present, no inflammatory lesions present, no masses present  · Bladder: non-tender to palpation  · Urethra: appears normal  · Cervix: normal   · Uterus: normal size, shape and consistency  · Adnexa: no adnexal tenderness present, no adnexal masses present  · Perineum: perineum within normal limits, no evidence of trauma, no rashes or skin lesions present  · Anus: anus within normal limits, no hemorrhoids present  · Inguinal Lymph Nodes: no lymphadenopathy present    Skin  · General Inspection: no rash, no lesions identified    Neurologic/Psychiatric  · Mental Status:  · Orientation: grossly oriented to person, place and time  · Mood and Affect: mood normal, affect appropriate    Assessment:  Routine gynecologic examination  Her current medical status is satisfactory with no evidence of significant gynecologic issues.     Plan:  Counseled re: diet, exercise, healthy lifestyle  Return for yearly wellness visits

## 2020-11-19 LAB
C TRACH RRNA SPEC QL NAA+PROBE: NEGATIVE
N GONORRHOEA RRNA SPEC QL NAA+PROBE: NEGATIVE
T VAGINALIS DNA SPEC QL NAA+PROBE: NEGATIVE

## 2021-12-02 ENCOUNTER — TELEPHONE (OUTPATIENT)
Dept: OBGYN CLINIC | Age: 26
End: 2021-12-02

## 2021-12-02 RX ORDER — NORETHINDRONE ACETATE AND ETHINYL ESTRADIOL 1.5-30(21)
KIT ORAL
Qty: 28 TABLET | Refills: 0 | Status: SHIPPED | OUTPATIENT
Start: 2021-12-02 | End: 2021-12-13 | Stop reason: SDUPTHER

## 2021-12-02 NOTE — TELEPHONE ENCOUNTER
Call received at 543am    27year old patient last seen in the office on 11/13/2020 and has upcoming appointment on 12/13/2021    Patient calling to get a refill of her ocp to get her to her appointment      Prescription sent as per Md order to patient confirmed pharmacy     Patient advised of need to keep appointment in order to get further refills. Patient verbalized understanding.

## 2021-12-13 ENCOUNTER — OFFICE VISIT (OUTPATIENT)
Dept: OBGYN CLINIC | Age: 26
End: 2021-12-13
Payer: COMMERCIAL

## 2021-12-13 VITALS — SYSTOLIC BLOOD PRESSURE: 116 MMHG | DIASTOLIC BLOOD PRESSURE: 60 MMHG | WEIGHT: 251.4 LBS | BODY MASS INDEX: 44.53 KG/M2

## 2021-12-13 DIAGNOSIS — Z01.419 ENCOUNTER FOR GYNECOLOGICAL EXAMINATION WITHOUT ABNORMAL FINDING: Primary | ICD-10-CM

## 2021-12-13 PROCEDURE — 99395 PREV VISIT EST AGE 18-39: CPT | Performed by: OBSTETRICS & GYNECOLOGY

## 2021-12-13 RX ORDER — NORETHINDRONE ACETATE AND ETHINYL ESTRADIOL 1.5-30(21)
1 KIT ORAL DAILY
Qty: 3 DOSE PACK | Refills: 4 | Status: SHIPPED | OUTPATIENT
Start: 2021-12-13

## 2021-12-13 NOTE — PROGRESS NOTES
Annual exam ages 19-44    Angie Browne is a ,  27 y.o. female   Patient's last menstrual period was 10/25/2021. She presents for her annual checkup. She is having no significant problems. With regard to the Gardasil vaccine, she has received all 3 injections. Menstrual status:  day  Her periods are spotting in flow. She is using three to ten pads or tampons per , very light to non existent due to contraceptive hormones. She does not have dysmenorrhea. She reports no premenstrual symptoms. Contraception:    The current method of family planning is OCP. Sexual history:    She  reports being sexually active and has had partner(s) who are male. She reports using the following method of birth control/protection: Pill. Medical conditions:    Since her last annual GYN exam about one year ago, she has not the following changes in her health history: none. Surgical history confirmed with patient. has a past surgical history that includes hx other surgical and hx dilation and curettage. Pap and Mammogram History:    Her most recent Pap smear was normal, obtained 2 year(s) ago.     The patient has never had a mammogram.    Breast Cancer History/Substance Abuse: negative    Past Medical History:   Diagnosis Date    Abnormal Pap smear of cervix     14 ASCUS HPV POS  7/1/15 ASCUS HPV POS    Depression     Genital herpes     Pap smear for cervical cancer screening 2017 ASCUS HPV POS  7/1/15 ASCUS HPV POS, 17 neg     Past Surgical History:   Procedure Laterality Date    HX DILATION AND CURETTAGE      HX OTHER SURGICAL      colonoscopy       Current Outpatient Medications   Medication Sig Dispense Refill    Blisovi Fe 1.5/30, 28, 1.5 mg-30 mcg (21)/75 mg (7) tab TAKE ONE TABLET BY MOUTH DAILY 28 Tablet 0    FLUoxetine (PROzac) 40 mg capsule       busPIRone (BUSPAR) 7.5 mg tablet       diclofenac EC (VOLTAREN) 75 mg EC tablet  (Patient not taking: Reported on 12/13/2021)      lactulose (CHRONULAC) 10 gram/15 mL solution Take 30 mL by mouth nightly. (Patient not taking: Reported on 12/13/2021) 1 Bottle 2    oxyCODONE-acetaminophen (PERCOCET) 5-325 mg per tablet Take 1 Tab by mouth every four (4) hours as needed. Max Daily Amount: 6 Tabs. (Patient not taking: Reported on 12/13/2021) 30 Tab 0    ibuprofen (MOTRIN) 800 mg tablet Take 1 Tab by mouth every eight (8) hours. (Patient not taking: Reported on 12/13/2021) 60 Tab 1    metroNIDAZOLE (FLAGYL) 500 mg tablet Take 1 Tab by mouth every twelve (12) hours. (Patient not taking: Reported on 12/13/2021) 14 Tab 1    valACYclovir (VALTREX) 1 gram tablet Take 1 Tab by mouth daily. (Patient not taking: Reported on 12/13/2021) 30 Tab 2    ondansetron hcl (ZOFRAN) 8 mg tablet Take 1 Tab by mouth every eight (8) hours as needed for Nausea. (Patient not taking: Reported on 12/13/2021) 60 Tab 5    doxylamine-pyridoxine, vit B6, (DICLEGIS) 10-10 mg TbEC DR tablet Take 2 Tabs by mouth nightly. (Patient not taking: Reported on 12/13/2021) 100 Tab 2    OTHER Birth Control medication. Unable to remember name (Patient not taking: Reported on 12/13/2021)       Allergies: Patient has no known allergies. Tobacco History:  reports that she has never smoked. She has never used smokeless tobacco.  Alcohol Abuse:  reports current alcohol use. Drug Abuse:  reports no history of drug use.     Family Medical/Cancer History:   Family History   Problem Relation Age of Onset    No Known Problems Mother         Review of Systems - History obtained from the patient  Constitutional: negative for weight loss, fever, night sweats  HEENT: negative for hearing loss, earache, congestion, snoring, sorethroat  CV: negative for chest pain, palpitations, edema  Resp: negative for cough, shortness of breath, wheezing  GI: negative for change in bowel habits, abdominal pain, black or bloody stools  : negative for frequency, dysuria, hematuria, vaginal discharge  MSK: negative for back pain, joint pain, muscle pain  Breast: negative for breast lumps, nipple discharge, galactorrhea  Skin :negative for itching, rash, hives  Neuro: negative for dizziness, headache, confusion, weakness  Psych: negative for anxiety, depression, change in mood  Heme/lymph: negative for bleeding, bruising, pallor    Physical Exam    Visit Vitals  /60   Wt 251 lb 6.4 oz (114 kg)   LMP 10/25/2021   BMI 44.53 kg/m²       Constitutional  · Appearance: well-nourished, well developed, alert, in no acute distress    HENT  · Head and Face: appears normal    Neck  · Inspection/Palpation: normal appearance, no masses or tenderness  · Lymph Nodes: no lymphadenopathy present  · Thyroid: gland size normal, nontender, no nodules or masses present on palpation    Chest  · Respiratory Effort: breathing unlabored    Breasts  · Inspection of Breasts: breasts symmetrical, no skin changes, no discharge present, nipple appearance normal, no skin retraction present  · Palpation of Breasts and Axillae: no masses present on palpation, no breast tenderness  · Axillary Lymph Nodes: no lymphadenopathy present    Gastrointestinal  · Abdominal Examination: abdomen non-tender to palpation, normal bowel sounds, no masses present  · Liver and spleen: no hepatomegaly present, spleen not palpable  · Hernias: no hernias identified    Genitourinary  · External Genitalia: normal appearance for age, no discharge present, no tenderness present, no inflammatory lesions present, no masses present, no atrophy present  · Vagina: normal vaginal vault without central or paravaginal defects, no discharge present, no inflammatory lesions present, no masses present  · Bladder: non-tender to palpation  · Urethra: appears normal  · Cervix: normal   · Uterus: normal size, shape and consistency  · Adnexa: no adnexal tenderness present, no adnexal masses present  · Perineum: perineum within normal limits, no evidence of trauma, no rashes or skin lesions present  · Anus: anus within normal limits, no hemorrhoids present  · Inguinal Lymph Nodes: no lymphadenopathy present    Skin  · General Inspection: no rash, no lesions identified    Neurologic/Psychiatric  · Mental Status:  · Orientation: grossly oriented to person, place and time  · Mood and Affect: mood normal, affect appropriate    Assessment:  Routine gynecologic examination  Her current medical status is satisfactory with no evidence of significant gynecologic issues.     Plan:  Counseled re: diet, exercise, healthy lifestyle  Return for yearly wellness visits  Gardisil counseling provided  Pt counseled regarding co-testing for high risk HPV with pap  Rec screening mammo at either 33 or 38

## 2021-12-17 LAB
C TRACH RRNA SPEC QL NAA+PROBE: NEGATIVE
N GONORRHOEA RRNA SPEC QL NAA+PROBE: NEGATIVE
SPECIMEN SOURCE: NORMAL
T VAGINALIS RRNA VAG QL NAA+PROBE: NEGATIVE

## 2022-03-19 PROBLEM — Z34.90 PREGNANCY: Status: ACTIVE | Noted: 2018-06-10

## 2023-01-25 ENCOUNTER — TELEPHONE (OUTPATIENT)
Dept: OBGYN CLINIC | Age: 28
End: 2023-01-25

## 2023-01-25 RX ORDER — NORETHINDRONE ACETATE AND ETHINYL ESTRADIOL 1.5-30(21)
1 KIT ORAL DAILY
Qty: 1 DOSE PACK | Refills: 0 | Status: SHIPPED | OUTPATIENT
Start: 2023-01-25

## 2023-01-25 NOTE — TELEPHONE ENCOUNTER
32year old patient last seen in the office on 12/13/2021 for ae and has next appointment on 2/10/2023 for ae    Patient calling to get a refill of her ocp to get her to her scheduled appointment. Patient advised of need to keep appointment in order to get further refills     Patient verbalized understanding.

## 2023-02-09 RX ORDER — VALACYCLOVIR HYDROCHLORIDE 1 G/1
1000 TABLET, FILM COATED ORAL DAILY
Qty: 30 TABLET | Refills: 2 | Status: CANCELLED | OUTPATIENT
Start: 2023-02-09

## 2023-02-10 ENCOUNTER — OFFICE VISIT (OUTPATIENT)
Dept: OBGYN CLINIC | Age: 28
End: 2023-02-10
Payer: COMMERCIAL

## 2023-02-10 VITALS
DIASTOLIC BLOOD PRESSURE: 77 MMHG | BODY MASS INDEX: 46.78 KG/M2 | WEIGHT: 264 LBS | HEIGHT: 63 IN | SYSTOLIC BLOOD PRESSURE: 118 MMHG | HEART RATE: 82 BPM

## 2023-02-10 DIAGNOSIS — Z20.2 EXPOSURE TO SEXUALLY TRANSMITTED DISEASE (STD): ICD-10-CM

## 2023-02-10 DIAGNOSIS — N92.0 SPOTTING: ICD-10-CM

## 2023-02-10 DIAGNOSIS — Z87.42 HISTORY OF ABNORMAL CERVICAL PAP SMEAR: ICD-10-CM

## 2023-02-10 DIAGNOSIS — Z01.419 ENCOUNTER FOR WELL WOMAN EXAM WITH ROUTINE GYNECOLOGICAL EXAM: ICD-10-CM

## 2023-02-10 DIAGNOSIS — Z12.4 CERVICAL CANCER SCREENING: Primary | ICD-10-CM

## 2023-02-10 DIAGNOSIS — Z11.3 VENEREAL DISEASE SCREENING: ICD-10-CM

## 2023-02-10 PROCEDURE — 99395 PREV VISIT EST AGE 18-39: CPT | Performed by: OBSTETRICS & GYNECOLOGY

## 2023-02-10 RX ORDER — NORETHINDRONE ACETATE AND ETHINYL ESTRADIOL 1.5-30(21)
1 KIT ORAL DAILY
Qty: 3 DOSE PACK | Refills: 4 | Status: SHIPPED | OUTPATIENT
Start: 2023-02-10

## 2023-02-10 RX ORDER — BUSPIRONE HYDROCHLORIDE 15 MG/1
TABLET ORAL
COMMUNITY
Start: 2022-12-06

## 2023-02-10 NOTE — PROGRESS NOTES
Annual exam  Shruti Krishnamurthy is a ,  32 y.o. female   No LMP recorded. She presents for her annual checkup. She is having no significant problems. Sexual history:    She  reports being sexually active and has had partner(s) who are male. She reports using the following method of birth control/protection: Pill. Per Nursing Note:  No LMP recorded. Her periods are  spotting  in flow and often irregular with no apparent pattern. She has dysmenorrhea. Problems: no significant problems. Wants worried well STD testing  Birth Control: OCP (estrogen/progesterone). Last Pap: normal obtained 2019  She does not have a history of CAR[place] 2, 3 or cervical cancer. With regard to the Gardisil vaccine, she has received all 3 injections. Past Medical History:   Diagnosis Date    Abnormal Pap smear of cervix     14 ASCUS HPV POS  7/1/15 ASCUS HPV POS    Depression     Genital herpes     Pap smear for cervical cancer screening 2017 ASCUS HPV POS  7/1/15 ASCUS HPV POS, 17 neg     Past Surgical History:   Procedure Laterality Date    HX DILATION AND CURETTAGE      HX OTHER SURGICAL      colonoscopy       Current Outpatient Medications   Medication Sig Dispense Refill    norethindrone-ethinyl estradiol-iron (Blisovi Fe 1.5/, 28,) 1.5 mg-30 mcg (21)/75 mg (7) tab Take 1 Tablet by mouth daily. Skip placebo pills and only take active pills to skip period 3 Dose Pack 4    busPIRone (BUSPAR) 15 mg tablet       FLUoxetine (PROzac) 40 mg capsule       diclofenac EC (VOLTAREN) 75 mg EC tablet  (Patient not taking: No sig reported)      busPIRone (BUSPAR) 7.5 mg tablet  (Patient not taking: Reported on 2/10/2023)      lactulose (CHRONULAC) 10 gram/15 mL solution Take 30 mL by mouth nightly. (Patient not taking: No sig reported) 1 Bottle 2    oxyCODONE-acetaminophen (PERCOCET) 5-325 mg per tablet Take 1 Tab by mouth every four (4) hours as needed. Max Daily Amount: 6 Tabs.  (Patient not taking: No sig reported) 30 Tab 0    ibuprofen (MOTRIN) 800 mg tablet Take 1 Tab by mouth every eight (8) hours. (Patient not taking: No sig reported) 60 Tab 1    metroNIDAZOLE (FLAGYL) 500 mg tablet Take 1 Tab by mouth every twelve (12) hours. (Patient not taking: No sig reported) 14 Tab 1    valACYclovir (VALTREX) 1 gram tablet Take 1 Tab by mouth daily. (Patient not taking: No sig reported) 30 Tab 2    ondansetron hcl (ZOFRAN) 8 mg tablet Take 1 Tab by mouth every eight (8) hours as needed for Nausea. (Patient not taking: No sig reported) 60 Tab 5    doxylamine-pyridoxine, vit B6, (DICLEGIS) 10-10 mg TbEC DR tablet Take 2 Tabs by mouth nightly. (Patient not taking: No sig reported) 100 Tab 2    OTHER Birth Control medication. Unable to remember name (Patient not taking: No sig reported)       Allergies: Patient has no known allergies. Tobacco History:  reports that she has never smoked. She has never used smokeless tobacco.  Alcohol Abuse:  reports current alcohol use. Drug Abuse:  reports no history of drug use.     Family Medical/Cancer History:   Family History   Problem Relation Age of Onset    No Known Problems Mother         Review of Systems - History obtained from the patient  Constitutional: negative for weight loss, fever, night sweats  HEENT: negative for hearing loss, earache, congestion, snoring, sorethroat  CV: negative for chest pain, palpitations, edema  Resp: negative for cough, shortness of breath, wheezing  GI: negative for change in bowel habits, abdominal pain, black or bloody stools  : negative for frequency, dysuria, hematuria, vaginal discharge  MSK: negative for back pain, joint pain, muscle pain  Breast: negative for breast lumps, nipple discharge, galactorrhea  Skin :negative for itching, rash, hives  Neuro: negative for dizziness, headache, confusion, weakness  Psych: negative for anxiety, depression, change in mood  Heme/lymph: negative for bleeding, bruising, pallor    Physical Exam    Visit Vitals  /77   Pulse 82   Ht 5' 3\" (1.6 m)   Wt 264 lb (119.7 kg)   BMI 46.77 kg/m²       Constitutional  Appearance: well-nourished, well developed, alert, in no acute distress    HENT  Head and Face: appears normal    Neck  Inspection/Palpation: normal appearance, no masses or tenderness  Lymph Nodes: no lymphadenopathy present  Thyroid: gland size normal, nontender, no nodules or masses present on palpation    Chest  Respiratory Effort: breathing unlabored    Breasts  Inspection of Breasts: breasts symmetrical, no skin changes, no discharge present, nipple appearance normal, no skin retraction present  Palpation of Breasts and Axillae: no masses present on palpation, no breast tenderness  Axillary Lymph Nodes: no lymphadenopathy present    Gastrointestinal  Abdominal Examination: abdomen non-tender to palpation, normal bowel sounds, no masses present  Liver and spleen: no hepatomegaly present, spleen not palpable  Hernias: no hernias identified    Genitourinary  External Genitalia: normal appearance for age, no discharge present, no tenderness present, no inflammatory lesions present, no masses present, no atrophy present  Vagina: normal vaginal vault without central or paravaginal defects, no discharge present, no inflammatory lesions present, no masses present  Bladder: non-tender to palpation  Urethra: appears normal  Cervix: normal   Uterus: normal size, shape and consistency  Adnexa: no adnexal tenderness present, no adnexal masses present  Perineum: perineum within normal limits, no evidence of trauma, no rashes or skin lesions present  Anus: anus within normal limits, no hemorrhoids present  Inguinal Lymph Nodes: no lymphadenopathy present    Skin  General Inspection: no rash, no lesions identified    Neurologic/Psychiatric  Mental Status:  Orientation: grossly oriented to person, place and time  Mood and Affect: mood normal, affect appropriate    Assessment:  Routine gynecologic examination  Her current medical status is satisfactory with no evidence of significant gynecologic issues. If irregular cycles recur then she will notify to switch to different pill.      Plan:  Counseled re: diet, exercise, healthy lifestyle  Return for yearly wellness visits  Pt counseled regarding co-testing for high risk HPV with pap  Rec screening mammo at either 33 or 38

## 2023-02-10 NOTE — PROGRESS NOTES
Daija Zimmerman is a 32 y.o. female returns for an annual exam     Chief Complaint   Patient presents with    Well Woman       No LMP recorded. Her periods are  spotting  in flow and often irregular with no apparent pattern. She has dysmenorrhea. Problems: no significant problems. Wants worried well STD testing  Birth Control: OCP (estrogen/progesterone). Last Pap: normal obtained 9/2019  She does not have a history of CAROLINA 2, 3 or cervical cancer. With regard to the Gardisil vaccine, she has received all 3 injections. 1. Have you been to the ER, urgent care clinic, or hospitalized since your last visit? No    2. Have you seen or consulted any other health care providers outside of the [address] [place] [address] since your last visit?  Yes PCP    Examination chaperoned by Kylee Sood MA.

## 2023-09-01 ENCOUNTER — APPOINTMENT (OUTPATIENT)
Facility: HOSPITAL | Age: 28
End: 2023-09-01
Payer: COMMERCIAL

## 2023-09-01 ENCOUNTER — HOSPITAL ENCOUNTER (EMERGENCY)
Facility: HOSPITAL | Age: 28
Discharge: HOME | End: 2023-09-01
Attending: EMERGENCY MEDICINE
Payer: COMMERCIAL

## 2023-09-01 VITALS
HEART RATE: 66 BPM | TEMPERATURE: 98.3 F | SYSTOLIC BLOOD PRESSURE: 142 MMHG | RESPIRATION RATE: 18 BRPM | DIASTOLIC BLOOD PRESSURE: 66 MMHG | OXYGEN SATURATION: 100 %

## 2023-09-01 DIAGNOSIS — R10.13 ABDOMINAL PAIN, EPIGASTRIC: Primary | ICD-10-CM

## 2023-09-01 LAB
ALBUMIN SERPL-MCNC: 3.7 G/DL (ref 3.5–5)
ALBUMIN/GLOB SERPL: 0.9 (ref 1.1–2.2)
ALP SERPL-CCNC: 100 U/L (ref 45–117)
ALT SERPL-CCNC: 57 U/L (ref 12–78)
ANION GAP SERPL CALC-SCNC: 7 MMOL/L (ref 5–15)
AST SERPL-CCNC: 24 U/L (ref 15–37)
BASOPHILS # BLD: 0.1 K/UL (ref 0–0.1)
BASOPHILS NFR BLD: 1 % (ref 0–1)
BILIRUB SERPL-MCNC: 0.4 MG/DL (ref 0.2–1)
BUN SERPL-MCNC: 11 MG/DL (ref 6–20)
BUN/CREAT SERPL: 15 (ref 12–20)
CALCIUM SERPL-MCNC: 9 MG/DL (ref 8.5–10.1)
CHLORIDE SERPL-SCNC: 109 MMOL/L (ref 97–108)
CO2 SERPL-SCNC: 23 MMOL/L (ref 21–32)
COMMENT:: NORMAL
CREAT SERPL-MCNC: 0.73 MG/DL (ref 0.55–1.02)
DIFFERENTIAL METHOD BLD: NORMAL
EKG ATRIAL RATE: 71 BPM
EKG DIAGNOSIS: NORMAL
EKG P AXIS: 15 DEGREES
EKG P-R INTERVAL: 150 MS
EKG Q-T INTERVAL: 382 MS
EKG QRS DURATION: 76 MS
EKG QTC CALCULATION (BAZETT): 415 MS
EKG R AXIS: 33 DEGREES
EKG T AXIS: 34 DEGREES
EKG VENTRICULAR RATE: 71 BPM
EOSINOPHIL # BLD: 0.1 K/UL (ref 0–0.4)
EOSINOPHIL NFR BLD: 1 % (ref 0–7)
ERYTHROCYTE [DISTWIDTH] IN BLOOD BY AUTOMATED COUNT: 12.3 % (ref 11.5–14.5)
GLOBULIN SER CALC-MCNC: 4.1 G/DL (ref 2–4)
GLUCOSE SERPL-MCNC: 116 MG/DL (ref 65–100)
HCG UR QL: NEGATIVE
HCT VFR BLD AUTO: 41.2 % (ref 35–47)
HGB BLD-MCNC: 13.5 G/DL (ref 11.5–16)
IMM GRANULOCYTES # BLD AUTO: 0 K/UL (ref 0–0.04)
IMM GRANULOCYTES NFR BLD AUTO: 0 % (ref 0–0.5)
LYMPHOCYTES # BLD: 2.4 K/UL (ref 0.8–3.5)
LYMPHOCYTES NFR BLD: 25 % (ref 12–49)
MCH RBC QN AUTO: 29.7 PG (ref 26–34)
MCHC RBC AUTO-ENTMCNC: 32.8 G/DL (ref 30–36.5)
MCV RBC AUTO: 90.7 FL (ref 80–99)
MONOCYTES # BLD: 0.4 K/UL (ref 0–1)
MONOCYTES NFR BLD: 5 % (ref 5–13)
NEUTS SEG # BLD: 6.5 K/UL (ref 1.8–8)
NEUTS SEG NFR BLD: 68 % (ref 32–75)
NRBC # BLD: 0 K/UL (ref 0–0.01)
NRBC BLD-RTO: 0 PER 100 WBC
PLATELET # BLD AUTO: 281 K/UL (ref 150–400)
PMV BLD AUTO: 12.5 FL (ref 8.9–12.9)
POTASSIUM SERPL-SCNC: 4.2 MMOL/L (ref 3.5–5.1)
PROT SERPL-MCNC: 7.8 G/DL (ref 6.4–8.2)
RBC # BLD AUTO: 4.54 M/UL (ref 3.8–5.2)
SODIUM SERPL-SCNC: 139 MMOL/L (ref 136–145)
SPECIMEN HOLD: NORMAL
TROPONIN I SERPL HS-MCNC: 4 NG/L (ref 0–51)
WBC # BLD AUTO: 9.4 K/UL (ref 3.6–11)

## 2023-09-01 PROCEDURE — 36415 COLL VENOUS BLD VENIPUNCTURE: CPT

## 2023-09-01 PROCEDURE — 81025 URINE PREGNANCY TEST: CPT

## 2023-09-01 PROCEDURE — 96374 THER/PROPH/DIAG INJ IV PUSH: CPT

## 2023-09-01 PROCEDURE — 99285 EMERGENCY DEPT VISIT HI MDM: CPT

## 2023-09-01 PROCEDURE — 71046 X-RAY EXAM CHEST 2 VIEWS: CPT

## 2023-09-01 PROCEDURE — 6360000002 HC RX W HCPCS: Performed by: EMERGENCY MEDICINE

## 2023-09-01 PROCEDURE — 74177 CT ABD & PELVIS W/CONTRAST: CPT

## 2023-09-01 PROCEDURE — 93005 ELECTROCARDIOGRAM TRACING: CPT | Performed by: EMERGENCY MEDICINE

## 2023-09-01 PROCEDURE — 2580000003 HC RX 258: Performed by: EMERGENCY MEDICINE

## 2023-09-01 PROCEDURE — 96375 TX/PRO/DX INJ NEW DRUG ADDON: CPT

## 2023-09-01 PROCEDURE — 80053 COMPREHEN METABOLIC PANEL: CPT

## 2023-09-01 PROCEDURE — 6370000000 HC RX 637 (ALT 250 FOR IP): Performed by: EMERGENCY MEDICINE

## 2023-09-01 PROCEDURE — 84484 ASSAY OF TROPONIN QUANT: CPT

## 2023-09-01 PROCEDURE — 2500000003 HC RX 250 WO HCPCS: Performed by: EMERGENCY MEDICINE

## 2023-09-01 PROCEDURE — 85025 COMPLETE CBC W/AUTO DIFF WBC: CPT

## 2023-09-01 PROCEDURE — 6360000004 HC RX CONTRAST MEDICATION: Performed by: RADIOLOGY

## 2023-09-01 RX ORDER — MAG HYDROX/ALUMINUM HYD/SIMETH 400-400-40
15 SUSPENSION, ORAL (FINAL DOSE FORM) ORAL EVERY 6 HOURS PRN
Qty: 355 ML | Refills: 0 | Status: SHIPPED | OUTPATIENT
Start: 2023-09-01

## 2023-09-01 RX ORDER — MAG HYDROX/ALUMINUM HYD/SIMETH 400-400-40
15 SUSPENSION, ORAL (FINAL DOSE FORM) ORAL EVERY 6 HOURS PRN
Qty: 355 ML | Refills: 0 | Status: SHIPPED | OUTPATIENT
Start: 2023-09-01 | End: 2023-09-01 | Stop reason: SDUPTHER

## 2023-09-01 RX ORDER — FAMOTIDINE 20 MG/1
20 TABLET, FILM COATED ORAL 2 TIMES DAILY
Qty: 30 TABLET | Refills: 0 | Status: SHIPPED | OUTPATIENT
Start: 2023-09-01 | End: 2023-09-16

## 2023-09-01 RX ORDER — MORPHINE SULFATE 4 MG/ML
4 INJECTION, SOLUTION INTRAMUSCULAR; INTRAVENOUS
Status: COMPLETED | OUTPATIENT
Start: 2023-09-01 | End: 2023-09-01

## 2023-09-01 RX ORDER — ONDANSETRON 2 MG/ML
4 INJECTION INTRAMUSCULAR; INTRAVENOUS ONCE
Status: COMPLETED | OUTPATIENT
Start: 2023-09-01 | End: 2023-09-01

## 2023-09-01 RX ADMIN — ONDANSETRON 4 MG: 2 INJECTION INTRAMUSCULAR; INTRAVENOUS at 05:04

## 2023-09-01 RX ADMIN — MORPHINE SULFATE 4 MG: 4 INJECTION, SOLUTION INTRAMUSCULAR; INTRAVENOUS at 06:25

## 2023-09-01 RX ADMIN — Medication 40 ML: at 06:00

## 2023-09-01 RX ADMIN — SODIUM CHLORIDE, PRESERVATIVE FREE 20 MG: 5 INJECTION INTRAVENOUS at 06:00

## 2023-09-01 RX ADMIN — IOPAMIDOL 100 ML: 755 INJECTION, SOLUTION INTRAVENOUS at 06:39

## 2023-09-01 ASSESSMENT — PAIN DESCRIPTION - ORIENTATION: ORIENTATION: RIGHT

## 2023-09-01 ASSESSMENT — PAIN SCALES - GENERAL: PAINLEVEL_OUTOF10: 7

## 2023-09-01 ASSESSMENT — PAIN DESCRIPTION - LOCATION
LOCATION: CHEST;BACK
LOCATION: BACK

## 2023-09-01 NOTE — DISCHARGE INSTRUCTIONS
Your CT scan did not show any acute abnormalities. A small area (lesion) was noted in your spleen which is on the left upper abdomen. Please discuss with your PCP who can order further imaging to get more information. Thank you.

## 2023-09-01 NOTE — ED PROVIDER NOTES
General: No scleral icterus. Cardiovascular:      Rate and Rhythm: Normal rate and regular rhythm. Pulmonary:      Effort: Pulmonary effort is normal. No respiratory distress. Breath sounds: No stridor. Abdominal:      General: There is no distension. Tenderness: There is abdominal tenderness. There is no guarding or rebound. Comments: Mild tenderness to palpation in epigastric region. Musculoskeletal:         General: No deformity. Cervical back: Neck supple. No rigidity. Skin:     Coloration: Skin is not jaundiced. Neurological:      Mental Status: She is oriented to person, place, and time. Psychiatric:         Behavior: Behavior normal.       DIAGNOSTIC RESULTS       RADIOLOGY:   Non-plain film images such as CT, Ultrasound and MRI are read by the radiologist. Plain radiographic images are visualized and preliminarily interpreted by the emergency physician with the below findings:    See ED course below. Interpretation per the Radiologist below, if available at the time of this note:    CT ABDOMEN PELVIS W IV CONTRAST Additional Contrast? None   Final Result   1. No bowel obstruction, ileus or perforation. No intra-abdominal abscess. 2. 2.9 cm x 2.3 cm indeterminate mildly hypodense lesion within the spleen. MR   can be performed for further characterization, if indicated. XR CHEST (2 VW)   Final Result   No acute process. LABS:  Labs Reviewed   COMPREHENSIVE METABOLIC PANEL - Abnormal; Notable for the following components:       Result Value    Chloride 109 (*)     Glucose 116 (*)     Globulin 4.1 (*)     Albumin/Globulin Ratio 0.9 (*)     All other components within normal limits   CBC WITH AUTO DIFFERENTIAL   EXTRA TUBES HOLD   TROPONIN   POC PREGNANCY UR-QUAL       All other labs were within normal range or not returned as of this dictation.     EMERGENCY DEPARTMENT COURSE and DIFFERENTIAL DIAGNOSIS/MDM:   Medical Decision Making  Differential diagnosis

## 2023-09-01 NOTE — ED NOTES
Pt discharged with paperwork. Pt had no questions about discharge plan and was alert & oriented in no distress.        Zach Noel RN  09/01/23 [id number]

## 2023-09-01 NOTE — ED TRIAGE NOTES
Patient arrived ambulatory w/ c/o thoracic right pain and chest pain starting a few hours ago. Patient denies falls or injuries. Patient denies D/V but nausea. Patient thought it was acid reflex and took Pepcid which she quickly vomited back up.     Pain 7/10